# Patient Record
Sex: MALE | Race: WHITE | NOT HISPANIC OR LATINO | Employment: OTHER | URBAN - METROPOLITAN AREA
[De-identification: names, ages, dates, MRNs, and addresses within clinical notes are randomized per-mention and may not be internally consistent; named-entity substitution may affect disease eponyms.]

---

## 2017-03-03 ENCOUNTER — ANESTHESIA EVENT (OUTPATIENT)
Dept: GASTROENTEROLOGY | Facility: CLINIC | Age: 72
End: 2017-03-03
Payer: MEDICARE

## 2017-03-03 ASSESSMENT — LIFESTYLE VARIABLES: TOBACCO_USE: 1

## 2017-03-03 NOTE — ANESTHESIA PREPROCEDURE EVALUATION
Anesthesia Evaluation     . Pt has had prior anesthetic. Type: MAC      ROS/MED HX    ENT/Pulmonary:     (+)tobacco use, Past use , . .    Neurologic: Comment: Raynaud's      Cardiovascular:     (+) hypertension----. : . . . :. .       METS/Exercise Tolerance:  3 - Able to walk 1-2 blocks without stopping   Hematologic:     (+) Other Hematologic Disorder-thrombocytosis      Musculoskeletal:  - neg musculoskeletal ROS       GI/Hepatic:     (+) Other GI/Hepatic history of colon cancer     (-) liver disease   Renal/Genitourinary:  - ROS Renal section negative       Endo:  - neg endo ROS       Psychiatric:  - neg psychiatric ROS       Infectious Disease:  - neg infectious disease ROS       Malignancy:   (+) Malignancy History of GI          Other:    - neg other ROS           Physical Exam  Normal systems: cardiovascular, pulmonary and dental    Airway   Mallampati: II    Dental     Cardiovascular       Pulmonary                     Anesthesia Plan      History & Physical Review  History and physical reviewed and following examination; no interval change.    ASA Status:  2 .    NPO Status:  > 4 hours    Plan for MAC with Intravenous induction. Reason for MAC:  Deep or markedly invasive procedure (G8)         Postoperative Care      Consents  Anesthetic plan, risks, benefits and alternatives discussed with:  Patient..                          .

## 2017-03-06 ENCOUNTER — HOSPITAL ENCOUNTER (OUTPATIENT)
Facility: CLINIC | Age: 72
Discharge: HOME | End: 2017-03-06
Attending: SURGERY | Admitting: SURGERY
Payer: MEDICARE

## 2017-03-06 ENCOUNTER — SURGERY (OUTPATIENT)
Age: 72
End: 2017-03-06

## 2017-03-06 ENCOUNTER — ANESTHESIA (OUTPATIENT)
Dept: GASTROENTEROLOGY | Facility: CLINIC | Age: 72
End: 2017-03-06
Payer: MEDICARE

## 2017-03-06 VITALS
OXYGEN SATURATION: 99 % | HEIGHT: 68 IN | SYSTOLIC BLOOD PRESSURE: 124 MMHG | DIASTOLIC BLOOD PRESSURE: 79 MMHG | TEMPERATURE: 97.5 F | RESPIRATION RATE: 16 BRPM | BODY MASS INDEX: 19.7 KG/M2 | WEIGHT: 130 LBS

## 2017-03-06 LAB — COLONOSCOPY: NORMAL

## 2017-03-06 PROCEDURE — 25000128 H RX IP 250 OP 636: Performed by: NURSE ANESTHETIST, CERTIFIED REGISTERED

## 2017-03-06 PROCEDURE — 25000125 ZZHC RX 250: Performed by: SURGERY

## 2017-03-06 PROCEDURE — 37000008 ZZH ANESTHESIA TECHNICAL FEE, 1ST 30 MIN: Performed by: SURGERY

## 2017-03-06 PROCEDURE — 25800025 ZZH RX 258: Performed by: SURGERY

## 2017-03-06 PROCEDURE — 25000125 ZZHC RX 250: Performed by: NURSE ANESTHETIST, CERTIFIED REGISTERED

## 2017-03-06 PROCEDURE — 45378 DIAGNOSTIC COLONOSCOPY: CPT | Performed by: SURGERY

## 2017-03-06 RX ORDER — LIDOCAINE 40 MG/G
CREAM TOPICAL
Status: DISCONTINUED | OUTPATIENT
Start: 2017-03-06 | End: 2017-03-06 | Stop reason: HOSPADM

## 2017-03-06 RX ORDER — SODIUM CHLORIDE, SODIUM LACTATE, POTASSIUM CHLORIDE, CALCIUM CHLORIDE 600; 310; 30; 20 MG/100ML; MG/100ML; MG/100ML; MG/100ML
INJECTION, SOLUTION INTRAVENOUS CONTINUOUS
Status: DISCONTINUED | OUTPATIENT
Start: 2017-03-06 | End: 2017-03-06 | Stop reason: HOSPADM

## 2017-03-06 RX ORDER — PROPOFOL 10 MG/ML
INJECTION, EMULSION INTRAVENOUS CONTINUOUS PRN
Status: DISCONTINUED | OUTPATIENT
Start: 2017-03-06 | End: 2017-03-06

## 2017-03-06 RX ORDER — PROPOFOL 10 MG/ML
INJECTION, EMULSION INTRAVENOUS PRN
Status: DISCONTINUED | OUTPATIENT
Start: 2017-03-06 | End: 2017-03-06

## 2017-03-06 RX ORDER — ONDANSETRON 2 MG/ML
INJECTION INTRAMUSCULAR; INTRAVENOUS PRN
Status: DISCONTINUED | OUTPATIENT
Start: 2017-03-06 | End: 2017-03-06

## 2017-03-06 RX ORDER — ONDANSETRON 2 MG/ML
4 INJECTION INTRAMUSCULAR; INTRAVENOUS
Status: DISCONTINUED | OUTPATIENT
Start: 2017-03-06 | End: 2017-03-06 | Stop reason: HOSPADM

## 2017-03-06 RX ORDER — GLYCOPYRROLATE 0.2 MG/ML
INJECTION, SOLUTION INTRAMUSCULAR; INTRAVENOUS PRN
Status: DISCONTINUED | OUTPATIENT
Start: 2017-03-06 | End: 2017-03-06

## 2017-03-06 RX ADMIN — PROPOFOL 20 MG: 10 INJECTION, EMULSION INTRAVENOUS at 09:00

## 2017-03-06 RX ADMIN — PROPOFOL 20 MG: 10 INJECTION, EMULSION INTRAVENOUS at 09:02

## 2017-03-06 RX ADMIN — SODIUM CHLORIDE, POTASSIUM CHLORIDE, SODIUM LACTATE AND CALCIUM CHLORIDE: 600; 310; 30; 20 INJECTION, SOLUTION INTRAVENOUS at 08:28

## 2017-03-06 RX ADMIN — GLYCOPYRROLATE 0.2 MG: 0.2 INJECTION, SOLUTION INTRAMUSCULAR; INTRAVENOUS at 08:59

## 2017-03-06 RX ADMIN — PROPOFOL 20 MG: 10 INJECTION, EMULSION INTRAVENOUS at 09:03

## 2017-03-06 RX ADMIN — PROPOFOL 200 MCG/KG/MIN: 10 INJECTION, EMULSION INTRAVENOUS at 09:02

## 2017-03-06 RX ADMIN — PROPOFOL 20 MG: 10 INJECTION, EMULSION INTRAVENOUS at 09:04

## 2017-03-06 RX ADMIN — LIDOCAINE HYDROCHLORIDE 1 ML: 10 INJECTION, SOLUTION INFILTRATION; PERINEURAL at 08:29

## 2017-03-06 RX ADMIN — ONDANSETRON 4 MG: 2 INJECTION INTRAMUSCULAR; INTRAVENOUS at 08:59

## 2017-03-06 RX ADMIN — PROPOFOL 20 MG: 10 INJECTION, EMULSION INTRAVENOUS at 09:01

## 2017-03-06 NOTE — ANESTHESIA POSTPROCEDURE EVALUATION
Patient: Claus Chambers    Procedure(s):  Colonoscopy   - Wound Class: II-Clean Contaminated    Diagnosis:screening  Diagnosis Additional Information: No value filed.    Anesthesia Type:  MAC    Note:  Anesthesia Post Evaluation    Patient location during evaluation: Phase 2  Patient participation: Able to fully participate in evaluation  Level of consciousness: awake  Pain management: adequate  Airway patency: patent  Cardiovascular status: acceptable  Respiratory status: acceptable  Hydration status: acceptable  PONV: none     Anesthetic complications: None          Last vitals:  Vitals:    03/06/17 0740   BP: 132/69   Resp: 18   Temp: 36.4  C (97.5  F)   SpO2: 100%         Electronically Signed By: Rey Hansen CRNA, APRN CRNA  March 6, 2017  9:26 AM

## 2017-03-06 NOTE — H&P
"[facility]  GI Pre-Procedure History & Physical      Name: Claus Chambers MRN#: [id number]   Age: 71 year old YOB: 1945     Date of Procedure: 3/6/2017    Primary care provider: Vladimir Quiñones      Reason for Procedure:   Screening (V76.51), Personal History of Polyps (V12.72), history of colon cancer    Problem List:   See a copy of the patient s current problem list from [facility].  I have reviewed this document and have no additions or corrections.    Current Outpatient Medications:      No current outpatient prescriptions on file.        Allergies:      Allergies   Allergen Reactions     Nka [No Known Allergies]         History:   See a copy of the patient s current past history from [facility].  I have reviewed this document and have no additions or corrections.    Physical Exam:   Vital Signs:  /69  Temp 97.5  F (36.4  C) (Oral)  Resp 18  Ht 1.727 m (5' 8\")  Wt 59 kg (130 lb)  SpO2 100%  BMI 19.77 kg/m2  Airway assessment:  Patient is able to open mouth wide  Patient is able to stick out tongue    ASA:  2  Mallampati Score: 2     Lungs:  No increased work of breathing, good air exchange, clear to auscultation bilaterally, no crackles or wheezing.   Cardiovascular:  Normal- regular rate and rhythm, normal S1 - S2, no S3 or S4, and no murmur noted.  Gastrointestinal:  Abdomen soft, non-tender.  BS normal. No masses, organomegaly.        Assessment:   Appropriately NPO.  No significant changes since H&P.    Plan:   Moderate (conscious) sedation     General and specific risks of the procedure of the procedure including pain, bleeding, and perforation were discussed. Possibility of missing lesions discussed. Prep and recovery were discussed. He asked appropriate questions and provided consent.      Patient's active problems diagnostically and therapeutically optimized for the planned procedure. Risks, benefits, alternatives to sedation and blood explained and consent obtained.  Risks, " benefits, alternatives to procedure explained and consent obtained.    I have reviewed the history and physical, lab finding(s), diagnostic data, medications, and the plan for sedation.  I have determined this patient to be an appropriate candidate for the planned sedation/procedure and have reassessed the patient immediately prior to sedation/procedure.    Elliot Edmonds MD

## 2017-03-06 NOTE — ANESTHESIA CARE TRANSFER NOTE
Patient: Claus Chambers    Procedure(s):  Colonoscopy   - Wound Class: II-Clean Contaminated    Diagnosis: screening  Diagnosis Additional Information: No value filed.    Anesthesia Type:   MAC     Note:    Patient transferred to:Phase II        Vitals: (Last set prior to Anesthesia Care Transfer)    CRNA VITALS  3/6/2017 0853 - 3/6/2017 0925      3/6/2017             Pulse: 104    Ht Rate: 104    SpO2: 100 %                Electronically Signed By: Rey Hansen CRNA, APRN CRNA  March 6, 2017  9:25 AM

## 2017-03-06 NOTE — ANESTHESIA CARE TRANSFER NOTE
Patient: Claus Chambers    Procedure(s):  Colonoscopy   - Wound Class: II-Clean Contaminated    Diagnosis: screening  Diagnosis Additional Information: No value filed.    Anesthesia Type:   MAC     Note:    Patient transferred to:Phase II        Vitals: (Last set prior to Anesthesia Care Transfer)    CRNA VITALS  3/6/2017 0853 - 3/6/2017 0926      3/6/2017             Pulse: 104    Ht Rate: 104    SpO2: 100 %                Electronically Signed By: Rey Hansen CRNA, APRN CRNA  March 6, 2017  9:26 AM

## 2021-06-01 ENCOUNTER — RECORDS - HEALTHEAST (OUTPATIENT)
Dept: ADMINISTRATIVE | Facility: CLINIC | Age: 76
End: 2021-06-01

## 2022-07-02 ENCOUNTER — HOSPITAL ENCOUNTER (EMERGENCY)
Facility: HOSPITAL | Age: 77
Discharge: HOME | End: 2022-07-02
Attending: EMERGENCY MEDICINE | Admitting: EMERGENCY MEDICINE
Payer: COMMERCIAL

## 2022-07-02 VITALS
HEART RATE: 101 BPM | TEMPERATURE: 98 F | WEIGHT: 138 LBS | SYSTOLIC BLOOD PRESSURE: 134 MMHG | OXYGEN SATURATION: 98 % | BODY MASS INDEX: 20.98 KG/M2 | DIASTOLIC BLOOD PRESSURE: 64 MMHG | RESPIRATION RATE: 16 BRPM

## 2022-07-02 DIAGNOSIS — R33.9 URINARY RETENTION: ICD-10-CM

## 2022-07-02 DIAGNOSIS — N30.01 ACUTE CYSTITIS WITH HEMATURIA: ICD-10-CM

## 2022-07-02 LAB
ALBUMIN UR-MCNC: 30 MG/DL
APPEARANCE UR: ABNORMAL
BACTERIA #/AREA URNS HPF: ABNORMAL /HPF
BILIRUB UR QL STRIP: NEGATIVE
COLOR UR AUTO: YELLOW
GLUCOSE UR STRIP-MCNC: NEGATIVE MG/DL
HGB UR QL STRIP: ABNORMAL
KETONES UR STRIP-MCNC: ABNORMAL MG/DL
LEUKOCYTE ESTERASE UR QL STRIP: ABNORMAL
NITRATE UR QL: NEGATIVE
PH UR STRIP: 6 [PH] (ref 5–7)
RBC URINE: 4 /HPF
SP GR UR STRIP: 1.01 (ref 1–1.03)
UROBILINOGEN UR STRIP-MCNC: <2 MG/DL
WBC CLUMPS #/AREA URNS HPF: PRESENT /HPF
WBC URINE: >182 /HPF

## 2022-07-02 PROCEDURE — 250N000013 HC RX MED GY IP 250 OP 250 PS 637: Performed by: EMERGENCY MEDICINE

## 2022-07-02 PROCEDURE — 87186 SC STD MICRODIL/AGAR DIL: CPT | Performed by: EMERGENCY MEDICINE

## 2022-07-02 PROCEDURE — 99284 EMERGENCY DEPT VISIT MOD MDM: CPT | Mod: 25

## 2022-07-02 PROCEDURE — 51702 INSERT TEMP BLADDER CATH: CPT

## 2022-07-02 PROCEDURE — 51798 US URINE CAPACITY MEASURE: CPT

## 2022-07-02 PROCEDURE — 81001 URINALYSIS AUTO W/SCOPE: CPT | Performed by: EMERGENCY MEDICINE

## 2022-07-02 RX ORDER — CEPHALEXIN 500 MG/1
500 CAPSULE ORAL 4 TIMES DAILY
Qty: 40 CAPSULE | Refills: 0 | Status: SHIPPED | OUTPATIENT
Start: 2022-07-02 | End: 2022-07-12

## 2022-07-02 RX ORDER — CEPHALEXIN 500 MG/1
500 CAPSULE ORAL ONCE
Status: COMPLETED | OUTPATIENT
Start: 2022-07-02 | End: 2022-07-02

## 2022-07-02 RX ADMIN — CEPHALEXIN 500 MG: 500 CAPSULE ORAL at 22:21

## 2022-07-03 ENCOUNTER — HOSPITAL ENCOUNTER (EMERGENCY)
Facility: HOSPITAL | Age: 77
Discharge: HOME | End: 2022-07-03
Attending: EMERGENCY MEDICINE | Admitting: EMERGENCY MEDICINE
Payer: COMMERCIAL

## 2022-07-03 VITALS
TEMPERATURE: 98.2 F | HEART RATE: 83 BPM | BODY MASS INDEX: 20.98 KG/M2 | DIASTOLIC BLOOD PRESSURE: 81 MMHG | WEIGHT: 138 LBS | SYSTOLIC BLOOD PRESSURE: 180 MMHG | OXYGEN SATURATION: 96 % | RESPIRATION RATE: 20 BRPM

## 2022-07-03 DIAGNOSIS — R19.7 DIARRHEA, UNSPECIFIED TYPE: ICD-10-CM

## 2022-07-03 DIAGNOSIS — R10.9 ABDOMINAL CRAMPING: ICD-10-CM

## 2022-07-03 LAB
ALBUMIN SERPL-MCNC: 3.6 G/DL (ref 3.5–5)
ALP SERPL-CCNC: 75 U/L (ref 45–120)
ALT SERPL W P-5'-P-CCNC: <9 U/L (ref 0–45)
ANION GAP SERPL CALCULATED.3IONS-SCNC: 14 MMOL/L (ref 5–18)
AST SERPL W P-5'-P-CCNC: 12 U/L (ref 0–40)
BASOPHILS # BLD AUTO: 0 10E3/UL (ref 0–0.2)
BASOPHILS NFR BLD AUTO: 0 %
BILIRUB DIRECT SERPL-MCNC: 0.4 MG/DL
BILIRUB SERPL-MCNC: 1 MG/DL (ref 0–1)
BUN SERPL-MCNC: 14 MG/DL (ref 8–28)
CALCIUM SERPL-MCNC: 9.2 MG/DL (ref 8.5–10.5)
CHLORIDE BLD-SCNC: 106 MMOL/L (ref 98–107)
CO2 SERPL-SCNC: 16 MMOL/L (ref 22–31)
CREAT SERPL-MCNC: 1.22 MG/DL (ref 0.7–1.3)
EOSINOPHIL # BLD AUTO: 0 10E3/UL (ref 0–0.7)
EOSINOPHIL NFR BLD AUTO: 0 %
ERYTHROCYTE [DISTWIDTH] IN BLOOD BY AUTOMATED COUNT: 16.3 % (ref 10–15)
GFR SERPL CREATININE-BSD FRML MDRD: 60 ML/MIN/1.73M2
GLUCOSE BLD-MCNC: 96 MG/DL (ref 70–125)
HCT VFR BLD AUTO: 37.5 % (ref 40–53)
HGB BLD-MCNC: 13.1 G/DL (ref 13.3–17.7)
HOLD SPECIMEN: NORMAL
IMM GRANULOCYTES # BLD: 0.1 10E3/UL
IMM GRANULOCYTES NFR BLD: 1 %
LACTATE SERPL-SCNC: 1.4 MMOL/L (ref 0.7–2)
LIPASE SERPL-CCNC: 9 U/L (ref 0–52)
LYMPHOCYTES # BLD AUTO: 0.6 10E3/UL (ref 0.8–5.3)
LYMPHOCYTES NFR BLD AUTO: 6 %
MAGNESIUM SERPL-MCNC: 1.8 MG/DL (ref 1.8–2.6)
MCH RBC QN AUTO: 39.1 PG (ref 26.5–33)
MCHC RBC AUTO-ENTMCNC: 34.9 G/DL (ref 31.5–36.5)
MCV RBC AUTO: 112 FL (ref 78–100)
MONOCYTES # BLD AUTO: 1 10E3/UL (ref 0–1.3)
MONOCYTES NFR BLD AUTO: 9 %
NEUTROPHILS # BLD AUTO: 8.6 10E3/UL (ref 1.6–8.3)
NEUTROPHILS NFR BLD AUTO: 84 %
NRBC # BLD AUTO: 0 10E3/UL
NRBC BLD AUTO-RTO: 0 /100
PLATELET # BLD AUTO: 234 10E3/UL (ref 150–450)
POTASSIUM BLD-SCNC: 4 MMOL/L (ref 3.5–5)
PROT SERPL-MCNC: 6.4 G/DL (ref 6–8)
RBC # BLD AUTO: 3.35 10E6/UL (ref 4.4–5.9)
SODIUM SERPL-SCNC: 136 MMOL/L (ref 136–145)
WBC # BLD AUTO: 10.2 10E3/UL (ref 4–11)

## 2022-07-03 PROCEDURE — 96360 HYDRATION IV INFUSION INIT: CPT

## 2022-07-03 PROCEDURE — 36415 COLL VENOUS BLD VENIPUNCTURE: CPT | Performed by: EMERGENCY MEDICINE

## 2022-07-03 PROCEDURE — 83735 ASSAY OF MAGNESIUM: CPT | Performed by: EMERGENCY MEDICINE

## 2022-07-03 PROCEDURE — 83605 ASSAY OF LACTIC ACID: CPT | Performed by: EMERGENCY MEDICINE

## 2022-07-03 PROCEDURE — 85025 COMPLETE CBC W/AUTO DIFF WBC: CPT | Performed by: EMERGENCY MEDICINE

## 2022-07-03 PROCEDURE — 258N000003 HC RX IP 258 OP 636: Performed by: EMERGENCY MEDICINE

## 2022-07-03 PROCEDURE — 250N000013 HC RX MED GY IP 250 OP 250 PS 637: Performed by: EMERGENCY MEDICINE

## 2022-07-03 PROCEDURE — 82248 BILIRUBIN DIRECT: CPT | Performed by: EMERGENCY MEDICINE

## 2022-07-03 PROCEDURE — 83690 ASSAY OF LIPASE: CPT | Performed by: EMERGENCY MEDICINE

## 2022-07-03 PROCEDURE — 99284 EMERGENCY DEPT VISIT MOD MDM: CPT | Mod: 25

## 2022-07-03 RX ORDER — DICYCLOMINE HCL 20 MG
20 TABLET ORAL 4 TIMES DAILY PRN
Qty: 21 TABLET | Refills: 0 | Status: SHIPPED | OUTPATIENT
Start: 2022-07-03 | End: 2022-07-13

## 2022-07-03 RX ORDER — LOPERAMIDE HYDROCHLORIDE 2 MG/1
2 TABLET ORAL 4 TIMES DAILY PRN
Qty: 21 TABLET | Refills: 0 | Status: SHIPPED | OUTPATIENT
Start: 2022-07-03

## 2022-07-03 RX ORDER — DICYCLOMINE HCL 20 MG
20 TABLET ORAL ONCE
Status: COMPLETED | OUTPATIENT
Start: 2022-07-03 | End: 2022-07-03

## 2022-07-03 RX ADMIN — DICYCLOMINE HYDROCHLORIDE 20 MG: 20 TABLET ORAL at 11:26

## 2022-07-03 RX ADMIN — SODIUM CHLORIDE, POTASSIUM CHLORIDE, SODIUM LACTATE AND CALCIUM CHLORIDE 1000 ML: 600; 310; 30; 20 INJECTION, SOLUTION INTRAVENOUS at 11:24

## 2022-07-03 ASSESSMENT — ENCOUNTER SYMPTOMS
DYSURIA: 0
ABDOMINAL PAIN: 0
SHORTNESS OF BREATH: 0
FEVER: 0
DIARRHEA: 1
HEMATURIA: 0
VOMITING: 0
SORE THROAT: 0
CONFUSION: 0
DIZZINESS: 0
JOINT SWELLING: 0
CHILLS: 0
NAUSEA: 0

## 2022-07-03 NOTE — ED NOTES
Bed: JNEDP-03  Expected date: 7/3/22  Expected time:   Means of arrival: Ambulance  Comments:  [facility]  Abd pain  77 M

## 2022-07-03 NOTE — ED NOTES
Wife at bedside. Denies blood in stool. Pt having pain only when stooling. Dr. Arreguin in room with pt. Pt did not take morning meds

## 2022-07-03 NOTE — ED TRIAGE NOTES
Pt brought in by [facility].  Pt states he has had difficulty urinating for the past week.  He had a Lombardi catheter removed last Friday after having urethral surgery.  He last urinated this afternoon, but unable to fully empty bladder.  Pt c/o bladder pain.  Pt denies fever.     Triage Assessment     Row Name 07/02/22 2016       Triage Assessment (Adult)    Airway WDL WDL       Respiratory WDL    Respiratory WDL WDL       Skin Circulation/Temperature WDL    Skin Circulation/Temperature WDL WDL       Cardiac WDL    Cardiac WDL WDL       Peripheral/Neurovascular WDL    Peripheral Neurovascular WDL WDL       Cognitive/Neuro/Behavioral WDL    Cognitive/Neuro/Behavioral WDL WDL

## 2022-07-03 NOTE — DISCHARGE INSTRUCTIONS
You were seen in the Emergency Department today for evaluation of urinary retention.  Your lab work showed a urinary tract infection. You were prescribed Keflex to take 4 times a day for 10 days. You should take all medications as prescribed.  Follow up with your primary care physician to ensure resolution of symptoms. Return if you have new or worsening symptoms.

## 2022-07-03 NOTE — ED PROVIDER NOTES
Emergency Department Encounter     Evaluation Date & Time:   7/3/2022 10:50 AM    CHIEF COMPLAINT:  Diarrhea      Triage Note:  Pt here yesterday and diagnosed with UTI. Peterson placed and started on keflex and sent home pt having episodes of abd cramping and diarrhea. Lives in own home with wife. [facility] medics brought pt in. Hx colon cancer with surgery 8 years ago               ED COURSE & MEDICAL DECISION MAKING:     ED Course as of 07/03/22 1345   Sun Jul 03, 2022   1219 WBC wnl, lactate unremarkable.   1235 Labs all overall reassuring, no KEATON or severe electrolyte derangement.   1249 Pt re-evaluated and updated. Abdomen benign, feels improved. Rx for imodium and bentyl prn. Encouraged good fluid intake, outpatient follow up.     Pt presenting with new onset of diarrhea with intermittent abdominal cramping starting late last night.  Pt seen here yesterday for urinary retention and UTI, had peterson placed, given first dose of PO keflex, has not started rest at home yet.  Pt with entirely benign abdomen here now.  He seems to have some chronic memory deficits, so wife helps with details of events.  Will get labs, hydrate and reassess.  Unlikely C-diff given no prior history and only receiving 1 dose of antibx.  Pt otherwise well appearing, reassuring vitals, afebrile, no current abd pain.    11:05 AM I met with the patient, obtained history, performed an initial exam, and discussed options and plan for diagnostics and treatment here in the ED.  12:36 PM I rechecked and updated the patient with results.  1:01 PM Patient ready for discharge.   At the conclusion of the encounter I discussed the results of all the tests and the disposition. The questions were answered. The patient or family acknowledged understanding and was agreeable with the care plan.      MEDICATIONS GIVEN IN THE EMERGENCY DEPARTMENT:  Medications   lactated ringers BOLUS 1,000 mL (0 mLs Intravenous Stopped 7/3/22 1253)   dicyclomine (BENTYL)  tablet 20 mg (20 mg Oral Given 7/3/22 1126)       NEW PRESCRIPTIONS STARTED AT TODAY'S ED VISIT:  Discharge Medication List as of 7/3/2022 12:53 PM      START taking these medications    Details   dicyclomine (BENTYL) 20 MG tablet Take 1 tablet (20 mg) by mouth 4 times daily as needed (abdominal pain/cramping), Disp-21 tablet, R-0, E-Prescribe      loperamide (IMODIUM A-D) 2 MG tablet Take 1 tablet (2 mg) by mouth 4 times daily as needed for diarrhea, Disp-21 tablet, R-0, E-Prescribe             HPI   HPI     Claus Chambers is a 77 year old male with a pertinent history of HTN, thrombocytosis, colon cancer who presents to this ED via EMS for evaluation of diarrhea.    Per patient's wife,  Patient was in the ER yesterday due to urinary retention. Patient had 1000 cc of urine drained. Patient had a bladder infection and was started on antibiotics. Patient had 2 episodes of diarrhea last night and incontinence this morning.     Patient denies any recent injuries or falls. States he had abdominal pain, which resolved after he had a bowel movement.    REVIEW OF SYSTEMS:  Review of Systems   Constitutional: Negative for chills and fever.   HENT: Negative for sore throat.    Eyes: Negative for visual disturbance.   Respiratory: Negative for shortness of breath.    Cardiovascular: Negative for chest pain.   Gastrointestinal: Positive for diarrhea. Negative for abdominal pain, nausea and vomiting.   Endocrine: Negative for polyuria.   Genitourinary: Negative for dysuria and hematuria.        - urinary changes     Musculoskeletal: Negative for joint swelling.   Skin: Negative for rash.   Neurological: Negative for dizziness.   Psychiatric/Behavioral: Negative for confusion.   All other systems reviewed and are negative.          Medical History   No past medical history on file.    Past Surgical History:   Procedure Laterality Date     COLON SURGERY Right 4/6/2015     COLONOSCOPY N/A 3/5/2015    Procedure: COMBINED COLONOSCOPY,  SINGLE OR MULTIPLE BIOPSY/POLYPECTOMY BY BIOPSY;  Surgeon: Elliot Edmonds MD;  Location: [facility]     COLONOSCOPY N/A 2016    Procedure: COLONOSCOPY;  Surgeon: Elliot Edmonds MD;  Location: [facility]     COLONOSCOPY N/A 3/6/2017    Procedure: COLONOSCOPY;  Surgeon: Elliot Edmonds MD;  Location: [facility]     HEMORRHOIDECTOMY EXTERNAL       JOINT REPLACEMENT Right     knee       Family History   Problem Relation Age of Onset     Dementia Mother      Coronary Artery Disease Father      Cerebrovascular Disease Father      Pneumonia Father        Social History     Tobacco Use     Smoking status: Former Smoker     Quit date: 2013     Years since quittin.6       dicyclomine (BENTYL) 20 MG tablet  loperamide (IMODIUM A-D) 2 MG tablet  AMLODIPINE BESYLATE 10 MG OR TABS  ASPIRIN 325 MG OR TABS  cephALEXin (KEFLEX) 500 MG capsule  hydroxyurea (HYDREA) 500 MG capsule  LISINOPRIL PO  psyllium 0.52 G capsule  senna-docusate (SENOKOT-S;PERICOLACE) 8.6-50 MG per tablet  TYLENOL COLD MULTI-SYMPTOM 5--325 MG OR TABS        Physical Exam     Vitals:  BP (!) 180/81   Pulse 83   Temp 98.2  F (36.8  C) (Oral)   Resp 20   Wt 62.6 kg (138 lb)   SpO2 96%   BMI 20.98 kg/m      PHYSICAL EXAM:   Physical Exam  Vitals and nursing note reviewed.   Constitutional:       General: He is not in acute distress.     Appearance: Normal appearance.   HENT:      Head: Normocephalic and atraumatic.      Nose: Nose normal.      Mouth/Throat:      Mouth: Mucous membranes are moist.   Eyes:      Pupils: Pupils are equal, round, and reactive to light.   Cardiovascular:      Rate and Rhythm: Normal rate and regular rhythm.      Pulses: Normal pulses.           Radial pulses are 2+ on the right side and 2+ on the left side.        Dorsalis pedis pulses are 2+ on the right side and 2+ on the left side.   Pulmonary:      Effort: Pulmonary effort is normal. No respiratory distress.      Breath sounds: Normal breath sounds.    Abdominal:      Palpations: Abdomen is soft.      Tenderness: There is no abdominal tenderness.   Musculoskeletal:      Cervical back: Full passive range of motion without pain and neck supple.      Comments: No calf tenderness or swelling b/l   Skin:     General: Skin is warm.      Findings: No rash.   Neurological:      General: No focal deficit present.      Mental Status: He is alert. Mental status is at baseline.      Comments: Fluent speech, no acute lateralizing deficits   Psychiatric:         Mood and Affect: Mood normal.         Behavior: Behavior normal.         Results     LAB:  All pertinent labs reviewed and interpreted  Labs Ordered and Resulted from Time of ED Arrival to Time of ED Departure   BASIC METABOLIC PANEL - Abnormal       Result Value    Sodium 136      Potassium 4.0      Chloride 106      Carbon Dioxide (CO2) 16 (*)     Anion Gap 14      Urea Nitrogen 14      Creatinine 1.22      Calcium 9.2      Glucose 96      GFR Estimate 60 (*)    CBC WITH PLATELETS AND DIFFERENTIAL - Abnormal    WBC Count 10.2      RBC Count 3.35 (*)     Hemoglobin 13.1 (*)     Hematocrit 37.5 (*)      (*)     MCH 39.1 (*)     MCHC 34.9      RDW 16.3 (*)     Platelet Count 234      % Neutrophils 84      % Lymphocytes 6      % Monocytes 9      % Eosinophils 0      % Basophils 0      % Immature Granulocytes 1      NRBCs per 100 WBC 0      Absolute Neutrophils 8.6 (*)     Absolute Lymphocytes 0.6 (*)     Absolute Monocytes 1.0      Absolute Eosinophils 0.0      Absolute Basophils 0.0      Absolute Immature Granulocytes 0.1      Absolute NRBCs 0.0     LACTIC ACID WHOLE BLOOD - Normal    Lactic Acid 1.4     LIPASE - Normal    Lipase 9     HEPATIC FUNCTION PANEL - Normal    Bilirubin Total 1.0      Bilirubin Direct 0.4      Protein Total 6.4      Albumin 3.6      Alkaline Phosphatase 75      AST 12      ALT <9     MAGNESIUM - Normal    Magnesium 1.8         RADIOLOGY:  No orders to display                 ECG:  none    PROCEDURES:  Procedures:  none      FINAL IMPRESSION:    ICD-10-CM    1. Diarrhea, unspecified type  R19.7    2. Abdominal cramping  R10.9        0 minutes of critical care time      I, Jaodn Irizarry, am serving as a scribe to document services personally performed by Dr. Ole Arreguin, based on my observations and the provider's statements to me. I, Ole Arreguin, DO attest that Jadon Irizarry is acting in a scribe capacity, has observed my performance of the services and has documented them in accordance with my direction.      Ole Arreguin DO  Emergency Medicine  [facility] EMERGENCY DEPARTMENT  7/3/2022  11:05 AM          Ole Arreguin MD  07/03/22 1316

## 2022-07-03 NOTE — ED TRIAGE NOTES
Pt here yesterday and diagnosed with UTI. Lombardi placed and started on keflex and sent home pt having episodes of abd cramping and diarrhea. Lives in own home with wife. [facility] medics brought pt in. Hx colon cancer with surgery 8 years ago     Triage Assessment     Row Name 07/03/22 1056       Triage Assessment (Adult)    Airway WDL WDL

## 2022-07-03 NOTE — ED PROVIDER NOTES
EMERGENCY DEPARTMENT ENCOUNTER      NAME: Claus Chambers  AGE: 77 year old male  YOB: 1945  MRN: [id number]  EVALUATION DATE & TIME: 7/2/2022  8:13 PM    PCP: Vladimir Quiñones    ED PROVIDER: Koki Garcia M.D.      Chief Complaint   Patient presents with     Difficulty urinating     FINAL IMPRESSION:  1. Acute cystitis with hematuria    2. Urinary retention      ED COURSE & MEDICAL DECISION MAKING:    Pertinent Labs & Imaging studies reviewed. (See chart for details)  ED Course as of 07/02/22 2254   Sat Jul 02, 2022 2045 I ordered a bladder scan on the patient came in given his complaint.  It looks like he has 1000 mL in the bladder so I have also ordered a Lombardi catheter and a urinalysis.   2109 Patient is a 77-year-old male comes in today for evaluation of urinary retention.  He had a procedure back in May with Dr. Lewis with urology where they dilated a urethral stricture.  He had a Lombardi catheter after that and was removed and he was doing well.  He reports his symptoms did not start up until today.  He came in tachycardic and hypertensive and when I saw him he was very uncomfortable.  He had greater than a liter on the bladder scan and I had ordered a Lombardi catheter.  They ended up putting it in when I was in there.  He started to get immediate relief.  We will send his urine for evaluation to see if there is any signs of infection.  Either way he is can leave the catheter in and he will need to follow-up with Dr. Lewis as an outpatient.  I discussed this with him and his wife and they are in agreement with the plan.  He is looking much better now that the catheter has been placed.   2159 Urine does appear infected so I will put the patient on some antibiotics and he can follow-up with Dr. Lewis as an outpatient.   2210 I updated the patient and family on the plan.  They are in agreement.  We will get him discharged.     8:57 PM I met with the patient for the initial interview and physical  examination. Discussed plan for treatment and workup in the ED.      At the conclusion of the encounter I discussed  the results of all of the tests and the disposition with patient.   All questions were answered.  The patient acknowledged understanding and was involved in the decision making regarding the overall care plan.      I discussed with patient the utility, limitations and findings of the exam/interventions/studies done during this visit as well as the list of differential diagnosis and symptoms to monitor/return to ER for.  Additional verbal discharge instructions were provided.     MEDICATIONS GIVEN IN THE EMERGENCY:  Medications   cephALEXin (KEFLEX) capsule 500 mg (500 mg Oral Given 7/2/22 2221)       NEW PRESCRIPTIONS STARTED AT TODAY'S ER VISIT  Discharge Medication List as of 7/2/2022 10:29 PM      START taking these medications    Details   cephALEXin (KEFLEX) 500 MG capsule Take 1 capsule (500 mg) by mouth 4 times daily for 10 days, Disp-40 capsule, R-0, E-Prescribe            ================================================================    HPI    Triage Note:   Pt brought in by [employer].  Pt states he has had difficulty urinating for the past week.  He had a Lombardi catheter removed last Friday after having urethral surgery.  He last urinated this afternoon, but unable to fully empty bladder.  Pt c/o bladder pain.  Pt denies fever.     Triage Assessment     Row Name 07/02/22 2016       Triage Assessment (Adult)    Airway WDL WDL       Respiratory WDL    Respiratory WDL WDL       Skin Circulation/Temperature WDL    Skin Circulation/Temperature WDL WDL       Cardiac WDL    Cardiac WDL WDL       Peripheral/Neurovascular WDL    Peripheral Neurovascular WDL WDL       Cognitive/Neuro/Behavioral WDL    Cognitive/Neuro/Behavioral WDL WDL              Patient information was obtained from: Patient     Use of : N/A       Claus Chambers is a 77 year old male with a pertinent medical  history of malignant neoplasm of colon who presents to this ED by ambulance for evaluation of difficulty with urinating.     Patient reports that he has had difficulty urinating suddenly starting today with associated bladder pain. Patient states that he also has associated nausea, vomiting, and diarrhea. He notes that he had a Lombardi catheter removed after having urethral surgery in May. Patient last urinated this afternoon, but was unable to completely empty bladder. He adds that he had previously had urinary retention like this ~13 years ago. Patient denies fever, dysuria, hematuria, or additional symptoms or complaints at this time.     REVIEW OF SYSTEMS   Except as stated in the HPI all other systems reviewed and are negative.    PAST MEDICAL HISTORY:  History reviewed. No pertinent past medical history.    PAST SURGICAL HISTORY:  Past Surgical History:   Procedure Laterality Date     COLON SURGERY Right 4/6/2015     COLONOSCOPY N/A 3/5/2015    Procedure: COMBINED COLONOSCOPY, SINGLE OR MULTIPLE BIOPSY/POLYPECTOMY BY BIOPSY;  Surgeon: Elliot Edmonds MD;  Location: [facility]     COLONOSCOPY N/A 4/11/2016    Procedure: COLONOSCOPY;  Surgeon: Elliot Edmonds MD;  Location: [facility]     COLONOSCOPY N/A 3/6/2017    Procedure: COLONOSCOPY;  Surgeon: Elliot Edmonds MD;  Location: [facility]     HEMORRHOIDECTOMY EXTERNAL       JOINT REPLACEMENT Right     knee       CURRENT MEDICATIONS:    No current facility-administered medications for this encounter.    Current Outpatient Medications:      cephALEXin (KEFLEX) 500 MG capsule, Take 1 capsule (500 mg) by mouth 4 times daily for 10 days, Disp: 40 capsule, Rfl: 0     AMLODIPINE BESYLATE 10 MG OR TABS, 1 TABLET BY MOUTH DAILY, Disp: , Rfl:      ASPIRIN 325 MG OR TABS, 1 TABLET BY MOUTH DAILY, Disp: , Rfl:      hydroxyurea (HYDREA) 500 MG capsule, Take by mouth daily, Disp: , Rfl:      LISINOPRIL PO, Take 20 mg by mouth, Disp: , Rfl:      psyllium 0.52 G capsule, Take 1  capsule by mouth daily, Disp: , Rfl:      senna-docusate (SENOKOT-S;PERICOLACE) 8.6-50 MG per tablet, Take 1 tablet by mouth daily, Disp: , Rfl:      TYLENOL COLD MULTI-SYMPTOM 5--325 MG OR TABS, AS NEEDED, Disp: , Rfl:     ALLERGIES:  Allergies   Allergen Reactions     Nka [No Known Allergies]        FAMILY HISTORY:  Family History   Problem Relation Age of Onset     Dementia Mother      Coronary Artery Disease Father      Cerebrovascular Disease Father      Pneumonia Father        SOCIAL HISTORY:   Social History     Socioeconomic History     Marital status:    Tobacco Use     Smoking status: Former Smoker     Quit date: 2013     Years since quittin.6       PHYSICAL EXAM    VITAL SIGNS: /64   Pulse 101   Temp 98  F (36.7  C) (Oral)   Resp 16   Wt 62.6 kg (138 lb)   SpO2 98%   BMI 20.98 kg/m     GENERAL: Awake, Alert, answering questions, Diaphoretic, uncomfortable-appearing  HEENT: Normal cephalic, Atraumatic, bilateral external ears normal, No scleral icterus, mask in place  NECK: No obvious swelling or abnormality, No stridor  PULMONARY:Normal and symmetric breath sounds, No respiratory distress, Lungs clear to auscultation bilaterally. No wheezing  CARDIOVASCULAR: Regular rhythm, Distal pulses present and normal. Tachycardic  ABDOMINAL:  Full palpable bladder with tenderness over the bladder  BACK: No tenderness.  EXTREMITIES: Moves all extremities spontaneously, warm, no edema, No major deformities  NEURO: No facial droop, normal motor function, Normal speech   PSYCH: Normal mood and affect  SKIN: No rashes on visualized skin, diaphoretic, warm     LAB:  All pertinent labs reviewed and interpreted.  Results for orders placed or performed during the hospital encounter of 22   UA with Microscopic reflex to Culture    Specimen: Urine, Lombardi Catheter   Result Value Ref Range    Color Urine Yellow Colorless, Straw, Light Yellow, Yellow    Appearance Urine Turbid (A) Clear     Glucose Urine Negative Negative mg/dL    Bilirubin Urine Negative Negative    Ketones Urine Trace (A) Negative mg/dL    Specific Gravity Urine 1.013 1.001 - 1.030    Blood Urine 0.03 mg/dL (A) Negative    pH Urine 6.0 5.0 - 7.0    Protein Albumin Urine 30  (A) Negative mg/dL    Urobilinogen Urine <2.0 <2.0 mg/dL    Nitrite Urine Negative Negative    Leukocyte Esterase Urine 500 Day/uL (A) Negative    Bacteria Urine Few (A) None Seen /HPF    WBC Clumps Urine Present (A) None Seen /HPF    RBC Urine 4 (H) <=2 /HPF    WBC Urine >182 (H) <=5 /HPF     I, Joseph Kristi, am serving as a scribe to document services personally performed by Dr. Garcia based on my observation and the provider's statements to me. I, Koki Garcia MD attest that Joseph Berry is acting in a scribe capacity, has observed my performance of the services and has documented them in accordance with my direction.    Koki Garcia M.D.  Emergency Medicine  [facility] EMERGENCY DEPARTMENT  [address] [place] [id number] [phone]  Dept: [phone]     Koki Garcia MD  07/02/22 7358

## 2022-07-03 NOTE — ED NOTES
Bed: JNED-19  Expected date: 7/2/22  Expected time: 7:52 PM  Means of arrival: Ambulance  Comments:  77 M [facility], unable to urinate

## 2022-07-03 NOTE — DISCHARGE INSTRUCTIONS
Take bentyl for abdominal pain/cramping.  Take imodium for diarrhea.  Stay hydrated with water/electrolyte fluids.  Follow up with primary clinic.

## 2022-07-05 LAB — BACTERIA UR CULT: ABNORMAL

## 2022-07-22 ENCOUNTER — HOSPITAL ENCOUNTER (EMERGENCY)
Facility: HOSPITAL | Age: 77
Discharge: HOME | End: 2022-07-22
Attending: EMERGENCY MEDICINE
Payer: MEDICARE

## 2022-07-22 VITALS
OXYGEN SATURATION: 93 % | DIASTOLIC BLOOD PRESSURE: 83 MMHG | SYSTOLIC BLOOD PRESSURE: 144 MMHG | BODY MASS INDEX: 19.65 KG/M2 | TEMPERATURE: 98.6 F | HEART RATE: 111 BPM | RESPIRATION RATE: 19 BRPM | WEIGHT: 129.63 LBS | HEIGHT: 68 IN

## 2022-07-22 DIAGNOSIS — R33.9 URINARY RETENTION: Primary | ICD-10-CM

## 2022-07-22 LAB
ANION GAP SERPL CALC-SCNC: 14 MMOL/L (ref 3–11)
ANISOCYTOSIS PRESENCE IN BLOOD, ANALYZER: ABNORMAL
BACTERIA #/AREA URNS AUTO: ABNORMAL /HPF
BASOPHILS # BLD AUTO: 0 10*3/UL
BASOPHILS NFR BLD AUTO: 0.2 % (ref 0–2)
BILIRUB UR QL STRIP.AUTO: NEGATIVE
BUN SERPL-MCNC: 13 MG/DL (ref 7–25)
CALCIUM SERPL-MCNC: 9.6 MG/DL (ref 8.6–10.3)
CHLORIDE SERPL-SCNC: 97 MMOL/L (ref 98–107)
CLARITY UR: ABNORMAL
CO2 SERPL-SCNC: 22 MMOL/L (ref 21–32)
COLOR UR: YELLOW
CREAT SERPL-MCNC: 1.29 MG/DL (ref 0.7–1.3)
EOSINOPHIL # BLD AUTO: 0 10*3/UL
EOSINOPHIL NFR BLD AUTO: 0.3 % (ref 0–3)
ERYTHROCYTE [DISTWIDTH] IN BLOOD BY AUTOMATED COUNT: 19.5 % (ref 11.5–15)
GFR SERPL CREATININE-BSD FRML MDRD: 56 ML/MIN/1.73M*2
GLUCOSE SERPL-MCNC: 105 MG/DL (ref 70–105)
GLUCOSE UR STRIP.AUTO-MCNC: NEGATIVE MG/DL
HCT VFR BLD AUTO: 38.2 % (ref 38–50)
HGB BLD-MCNC: 13.2 G/DL (ref 13.2–17.2)
HGB UR QL STRIP.AUTO: ABNORMAL
KETONES UR STRIP.AUTO-MCNC: NEGATIVE MG/DL
LEUKOCYTE ESTERASE UR QL STRIP: ABNORMAL
LYMPHOCYTES # BLD AUTO: 0.5 10*3/UL
LYMPHOCYTES NFR BLD AUTO: 4.6 % (ref 15–47)
MACROCYTOSIS PRESENCE IN BLOOD, ANALYZER: ABNORMAL
MCH RBC QN AUTO: 38.6 PG (ref 29–34)
MCHC RBC AUTO-ENTMCNC: 34.7 G/DL (ref 32–36)
MCV RBC AUTO: 111.4 FL (ref 82–97)
MONOCYTES # BLD AUTO: 0.6 10*3/UL
MONOCYTES NFR BLD AUTO: 4.9 % (ref 5–13)
NEUTROPHILS # BLD AUTO: 10.4 10*3/UL
NEUTROPHILS NFR BLD AUTO: 90 % (ref 46–70)
NITRITE UR QL STRIP.AUTO: NEGATIVE
PH UR STRIP.AUTO: 6 PH
PLATELET # BLD AUTO: 235 10*3/UL (ref 130–350)
PMV BLD AUTO: 6.3 FL (ref 6.9–10.8)
POTASSIUM SERPL-SCNC: 4.1 MMOL/L (ref 3.5–5.1)
PROT UR STRIP.AUTO-MCNC: 100 MG/DL
RBC # BLD AUTO: 3.43 10*6/ΜL (ref 4.1–5.8)
RBC #/AREA URNS AUTO: ABNORMAL /HPF
SODIUM SERPL-SCNC: 133 MMOL/L (ref 135–145)
SP GR UR STRIP.AUTO: 1.01 (ref 1–1.03)
SQUAMOUS #/AREA URNS AUTO: NEGATIVE /HPF
UROBILINOGEN UR STRIP.AUTO-MCNC: <2 E.U./DL
WBC # BLD AUTO: 11.6 10*3/UL (ref 3.7–9.6)
WBC #/AREA URNS AUTO: >100 /HPF
WBC CLUMPS #/AREA URNS AUTO: PRESENT /HPF

## 2022-07-22 PROCEDURE — 51798 US URINE CAPACITY MEASURE: CPT

## 2022-07-22 PROCEDURE — 80048 BASIC METABOLIC PNL TOTAL CA: CPT | Performed by: EMERGENCY MEDICINE

## 2022-07-22 PROCEDURE — 85025 COMPLETE CBC W/AUTO DIFF WBC: CPT | Performed by: EMERGENCY MEDICINE

## 2022-07-22 PROCEDURE — 2500000200 HC RX 250 WO HCPCS: Performed by: EMERGENCY MEDICINE

## 2022-07-22 PROCEDURE — 36415 COLL VENOUS BLD VENIPUNCTURE: CPT | Performed by: EMERGENCY MEDICINE

## 2022-07-22 PROCEDURE — 81001 URINALYSIS AUTO W/SCOPE: CPT | Performed by: EMERGENCY MEDICINE

## 2022-07-22 PROCEDURE — 51702 INSERT TEMP BLADDER CATH: CPT

## 2022-07-22 PROCEDURE — 99283 EMERGENCY DEPT VISIT LOW MDM: CPT

## 2022-07-22 PROCEDURE — 99284 EMERGENCY DEPT VISIT MOD MDM: CPT | Performed by: EMERGENCY MEDICINE

## 2022-07-22 RX ORDER — CEPHALEXIN 500 MG/1
500 CAPSULE ORAL 4 TIMES DAILY
Qty: 28 CAPSULE | Refills: 0 | Status: SHIPPED | OUTPATIENT
Start: 2022-07-22 | End: 2022-07-29

## 2022-07-22 RX ORDER — ASPIRIN 81 MG/1
81 TABLET ORAL DAILY
COMMUNITY

## 2022-07-22 RX ORDER — DOCUSATE SODIUM 100 MG/1
100 CAPSULE, LIQUID FILLED ORAL 2 TIMES DAILY
COMMUNITY

## 2022-07-22 RX ORDER — ACETAMINOPHEN 500 MG
500 TABLET ORAL EVERY 6 HOURS PRN
COMMUNITY

## 2022-07-22 RX ORDER — TAMSULOSIN HYDROCHLORIDE 0.4 MG/1
CAPSULE ORAL
COMMUNITY

## 2022-07-22 RX ORDER — LIDOCAINE HYDROCHLORIDE 20 MG/ML
6 JELLY TOPICAL ONCE AS NEEDED
Status: DISCONTINUED | OUTPATIENT
Start: 2022-07-22 | End: 2022-07-22 | Stop reason: HOSPADM

## 2022-07-22 RX ADMIN — LIDOCAINE HYDROCHLORIDE 6 ML: 20 JELLY TOPICAL at 12:55

## 2022-07-22 NOTE — ED PROVIDER NOTES
Room: 11    HPI:  Chief Complaint   Patient presents with   • Urinary Retention     Patient presents to triage for urinary retention, constipation, UTI     HPI  Patient presents for evaluation of urinary retention.  He had urine retention earlier this month with a UTI and was in the hospital in [place].  He was discharged 10 days ago.  He finished his course of Keflex and traveled here for a birthday.  Through the night he has had troubles urinating and only able to urinate a small amount and then less this morning.  He has suprapubic discomfort.  He denies nausea or vomiting.  He has not had fever.  Has not had cough.    HISTORY:  Past Medical History:   Diagnosis Date   • Colon cancer (CMS/HCC) (HCC)    • Hypertension    • Seizures (CMS/HCC) (HCC)        Past Surgical History:   Procedure Laterality Date   • COLECTOMY     • KNEE SURGERY         History reviewed. No pertinent family history.    Social History     Tobacco Use   • Smoking status: Former Smoker   • Smokeless tobacco: Never Used   Substance Use Topics   • Alcohol use: Yes       ROS:  Constitutional: Negative for fever.   HENT: Negative for sore throat.    Eyes: Negative for pain.   Respiratory: Negative for shortness of breath.    Cardiovascular: Negative for chest pain.   Gastrointestinal: Negative for abdominal pain.   Endocrine: Urinary retention urinary retention  Genitourinary: Negative for flank pain.   Musculoskeletal: Negative for back pain.   Neurological: Negative for headaches.   Hematological: Negative for bleeding.    PHYSICAL EXAM:  ED Triage Vitals   Temp Heart Rate Resp BP SpO2   07/22/22 1202 07/22/22 1202 07/22/22 1202 07/22/22 1202 07/22/22 1202   36.6 °C (97.9 °F) 117 15 152/80 98 %      Mean BP (mmHg) Temp Source Heart Rate Source Patient Position BP Location   07/22/22 1225 07/22/22 1202 -- 07/22/22 1225 --   127 Oral  Sitting       FiO2 (%)       --                Nursing note and vitals reviewed.  Constitutional: appears  well-developed.   HENT: Oral mucosa moist  Head: Normocephalic and atraumatic.   Eyes: Pupils are equal, round, .   Neck: Supple,   Cardiovascular: Regular rate and rhythm with no murmur, rub, or gallop.  Normal pulses.  Pulmonary/Chest: No respiratory distress.  Clear to auscultation bilaterally.  Abdominal: Soft and nontender.  Mild suprapubic distention and tenderness  Back: No CVA tenderness.  Musculoskeletal: No edema  Neurological: Alert.  No focal deficits or weakness  Skin: Skin is warm and dry. No rash noted.   Psychiatric: Normal mood and affect.    Labs Reviewed   CBC WITH AUTO DIFFERENTIAL - Abnormal       Result Value    WBC 11.6 (*)     RBC 3.43 (*)     Hemoglobin 13.2      Hematocrit 38.2      .4 (*)     MCH 38.6 (*)     MCHC 34.7      RDW 19.5 (*)     Platelets 235      MPV 6.3 (*)     Neutrophils% 90.0 (*)     Lymphocytes% 4.6 (*)     Monocytes% 4.9 (*)     Eosinophils% 0.3      Basophils% 0.2      ANC (auto diff) 10.40      Lymphocytes Absolute 0.50      Monocytes Absolute 0.60      Eosinophils Absolute 0.00      Basophils Absolute 0.00      Anisocytosis 1+      Macrocytosis 3+     BASIC METABOLIC PANEL - Abnormal    Sodium 133 (*)     Potassium 4.1      Chloride 97 (*)     CO2 22      BUN 13      Creatinine 1.29      Glucose 105      Calcium 9.6      Anion Gap 14 (*)     eGFR 56 (*)     Narrative:     Calculation based on the 2021 Chronic Kidney Disease Epidemiology Collaboration (CKD-EPI) equation refit without adjustment for race.   URINALYSIS, MICROSCOPIC ONLY - Abnormal    RBC, Urine 50-75 (*)     WBC, Urine >100 (*)     WBC Clumps, Urine Present (*)     Squamous Epithelial, Urine Negative      Bacteria, Urine Many (*)    URINALYSIS, DIPSTICK ONLY, FOR USE WITH MICROSCOPIC PANEL - Abnormal    Color, Urine Yellow      Clarity, Urine Turbid (*)     Specific Gravity, Urine 1.008      Leukocytes, Urine Large (*)     Nitrite, Urine Negative      Protein, Urine 100  (*)     Ketones, Urine  Negative      Urobilinogen, Urine <2.0      Bilirubin, Urine Negative      Blood, Urine Moderate (*)     Glucose, Urine Negative      pH, Urine 6.0     URINALYSIS WITH MICROSCOPIC    Narrative:     The following orders were created for panel order Urinalysis w/microscopic Urine, Clean Catch.  Procedure                               Abnormality         Status                     ---------                               -----------         ------                     Urinalysis, microscopic U...[[id number]]  Abnormal            Final result               Urinalysis, dipstick Urin...[[id number]]  Abnormal            Final result                 Please view results for these tests on the individual orders.         No orders to display         ED Medication Administration from 07/22/2022 1158 to 07/22/2022 1518       Date/Time Order Dose Route Action Action by     07/22/2022 1255 lidocaine HCL (GLYDO) 2 % jelly in applicator 6 mL 6 mL intra-urethral Given CARLENE Munguia            PROCEDURES:  Procedures      ED COURSE:  ED Course as of 08/10/22 1445   Fri Jul 22, 2022   1443 Patient has some signs of early UTI.  He feels well otherwise.  Catheter is in.  At this time I offered hospitalization but he would like to go.  He will be discharged with leg bag and antibiotic and follow-up upon return home. [BE]      ED Course User Index  [BE] Hilaria Roland MD       Sepsis Quality Bundle      MDM:     Patient presents with some suprapubic discomfort and urinary retention.  He had urine retention 1 month ago was [place] out [place].  I did review his records earlier this month he acute his cystitis and hematuria and urinary retention.  He was discharged with Keflex at that time.  He is here traveling.  He denies fever or vomiting or back pain.  Catheter was placed and urine was drained.  This urine does show some signs of infection.  His renal function is normal.  He has no significant leukocytosis or anemia.  He has no  electrolyte abnormalities.  He does not appear to be septic.  At this time I did offer hospitalization but he wishes to go.  He will be discharged with catheter and leg bag and antibiotics and close follow-up.      CLINICAL IMPRESSION:  Final diagnoses:   [R33.9] Urinary retention   UTI  Suprapubic discomfort  History of colon cancer  Seizure disorder  Hypertension      A voice recognition program was used to aid in documentation of this record.  Sometimes words are not printed exactly as they were spoken.  While efforts were made to carefully edit and correct any inaccuracies, some areas may be present; please take these into context.  Please contact the provider if areas are identified.             Hilaria Roland MD  08/10/22 3007

## 2022-07-22 NOTE — DISCHARGE INSTRUCTIONS
Your urine shows some signs of infection.    Take antibiotic as directed.  Return if new or worsening symptoms or concerns.  Follow-up with your doctor for close reevaluation.

## 2023-06-19 ENCOUNTER — APPOINTMENT (OUTPATIENT)
Dept: CT IMAGING | Facility: HOSPITAL | Age: 78
End: 2023-06-19
Attending: STUDENT IN AN ORGANIZED HEALTH CARE EDUCATION/TRAINING PROGRAM
Payer: COMMERCIAL

## 2023-06-19 ENCOUNTER — APPOINTMENT (OUTPATIENT)
Dept: RADIOLOGY | Facility: HOSPITAL | Age: 78
End: 2023-06-19
Attending: PHYSICIAN ASSISTANT
Payer: COMMERCIAL

## 2023-06-19 ENCOUNTER — TELEPHONE (OUTPATIENT)
Dept: NEUROSURGERY | Facility: CLINIC | Age: 78
End: 2023-06-19

## 2023-06-19 ENCOUNTER — HOSPITAL ENCOUNTER (EMERGENCY)
Facility: HOSPITAL | Age: 78
Discharge: HOME | End: 2023-06-19
Attending: STUDENT IN AN ORGANIZED HEALTH CARE EDUCATION/TRAINING PROGRAM | Admitting: STUDENT IN AN ORGANIZED HEALTH CARE EDUCATION/TRAINING PROGRAM
Payer: COMMERCIAL

## 2023-06-19 ENCOUNTER — APPOINTMENT (OUTPATIENT)
Dept: MRI IMAGING | Facility: HOSPITAL | Age: 78
End: 2023-06-19
Attending: STUDENT IN AN ORGANIZED HEALTH CARE EDUCATION/TRAINING PROGRAM
Payer: COMMERCIAL

## 2023-06-19 VITALS
HEART RATE: 85 BPM | OXYGEN SATURATION: 94 % | SYSTOLIC BLOOD PRESSURE: 177 MMHG | RESPIRATION RATE: 17 BRPM | BODY MASS INDEX: 21.97 KG/M2 | WEIGHT: 136.69 LBS | DIASTOLIC BLOOD PRESSURE: 80 MMHG | HEIGHT: 66 IN | TEMPERATURE: 97.5 F

## 2023-06-19 DIAGNOSIS — S22.089A CLOSED T12 FRACTURE (H): Primary | ICD-10-CM

## 2023-06-19 DIAGNOSIS — N39.0 URINARY TRACT INFECTION WITHOUT HEMATURIA, SITE UNSPECIFIED: ICD-10-CM

## 2023-06-19 DIAGNOSIS — S22.080A COMPRESSION FRACTURE OF T12 VERTEBRA, INITIAL ENCOUNTER (H): ICD-10-CM

## 2023-06-19 PROBLEM — G81.91 HEMIPLEGIA AFFECTING RIGHT DOMINANT SIDE, UNSPECIFIED ETIOLOGY, UNSPECIFIED HEMIPLEGIA TYPE (H): Status: ACTIVE | Noted: 2022-05-11

## 2023-06-19 PROBLEM — D72.829 LEUKOCYTOSIS: Status: ACTIVE | Noted: 2023-06-19

## 2023-06-19 PROBLEM — N35.011 TRAUMATIC BULBOUS URETHRAL STRICTURE: Status: ACTIVE | Noted: 2022-09-06

## 2023-06-19 PROBLEM — I10 HYPERTENSION: Status: ACTIVE | Noted: 2021-09-22

## 2023-06-19 PROBLEM — R33.9 RETENTION OF URINE: Status: ACTIVE | Noted: 2022-09-06

## 2023-06-19 LAB
ALBUMIN SERPL BCG-MCNC: 4.2 G/DL (ref 3.5–5.2)
ALBUMIN UR-MCNC: 30 MG/DL
ALP SERPL-CCNC: 100 U/L (ref 40–129)
ALT SERPL W P-5'-P-CCNC: 12 U/L (ref 0–70)
ANION GAP SERPL CALCULATED.3IONS-SCNC: 14 MMOL/L (ref 7–15)
APPEARANCE UR: ABNORMAL
AST SERPL W P-5'-P-CCNC: 22 U/L (ref 0–45)
BACTERIA #/AREA URNS HPF: ABNORMAL /HPF
BASOPHILS # BLD AUTO: 0 10E3/UL (ref 0–0.2)
BASOPHILS NFR BLD AUTO: 0 %
BILIRUB SERPL-MCNC: 0.9 MG/DL
BILIRUB UR QL STRIP: NEGATIVE
BUN SERPL-MCNC: 10.2 MG/DL (ref 8–23)
CALCIUM SERPL-MCNC: 9.7 MG/DL (ref 8.8–10.2)
CHLORIDE SERPL-SCNC: 106 MMOL/L (ref 98–107)
COLOR UR AUTO: YELLOW
CREAT SERPL-MCNC: 1.17 MG/DL (ref 0.67–1.17)
DEPRECATED HCO3 PLAS-SCNC: 22 MMOL/L (ref 22–29)
EOSINOPHIL # BLD AUTO: 0 10E3/UL (ref 0–0.7)
EOSINOPHIL NFR BLD AUTO: 0 %
ERYTHROCYTE [DISTWIDTH] IN BLOOD BY AUTOMATED COUNT: 13.9 % (ref 10–15)
GFR SERPL CREATININE-BSD FRML MDRD: 63 ML/MIN/1.73M2
GLUCOSE SERPL-MCNC: 105 MG/DL (ref 70–99)
GLUCOSE UR STRIP-MCNC: NEGATIVE MG/DL
HCT VFR BLD AUTO: 43.1 % (ref 40–53)
HGB BLD-MCNC: 15.4 G/DL (ref 13.3–17.7)
HGB UR QL STRIP: ABNORMAL
IMM GRANULOCYTES # BLD: 0.1 10E3/UL
IMM GRANULOCYTES NFR BLD: 1 %
KETONES UR STRIP-MCNC: ABNORMAL MG/DL
LACTATE SERPL-SCNC: 2 MMOL/L (ref 0.7–2)
LEUKOCYTE ESTERASE UR QL STRIP: ABNORMAL
LYMPHOCYTES # BLD AUTO: 0.5 10E3/UL (ref 0.8–5.3)
LYMPHOCYTES NFR BLD AUTO: 6 %
MCH RBC QN AUTO: 40.3 PG (ref 26.5–33)
MCHC RBC AUTO-ENTMCNC: 35.7 G/DL (ref 31.5–36.5)
MCV RBC AUTO: 113 FL (ref 78–100)
MONOCYTES # BLD AUTO: 0.8 10E3/UL (ref 0–1.3)
MONOCYTES NFR BLD AUTO: 9 %
MUCOUS THREADS #/AREA URNS LPF: PRESENT /LPF
NEUTROPHILS # BLD AUTO: 7.3 10E3/UL (ref 1.6–8.3)
NEUTROPHILS NFR BLD AUTO: 84 %
NITRATE UR QL: NEGATIVE
NRBC # BLD AUTO: 0 10E3/UL
NRBC BLD AUTO-RTO: 0 /100
PH UR STRIP: 6.5 [PH] (ref 5–7)
PLATELET # BLD AUTO: 179 10E3/UL (ref 150–450)
POTASSIUM SERPL-SCNC: 3.9 MMOL/L (ref 3.4–5.3)
PROT SERPL-MCNC: 6.9 G/DL (ref 6.4–8.3)
RBC # BLD AUTO: 3.82 10E6/UL (ref 4.4–5.9)
RBC URINE: 3 /HPF
SODIUM SERPL-SCNC: 142 MMOL/L (ref 136–145)
SP GR UR STRIP: 1.02 (ref 1–1.03)
UROBILINOGEN UR STRIP-MCNC: <2 MG/DL
WBC # BLD AUTO: 8.7 10E3/UL (ref 4–11)
WBC CLUMPS #/AREA URNS HPF: PRESENT /HPF
WBC URINE: 163 /HPF

## 2023-06-19 PROCEDURE — 87086 URINE CULTURE/COLONY COUNT: CPT | Performed by: STUDENT IN AN ORGANIZED HEALTH CARE EDUCATION/TRAINING PROGRAM

## 2023-06-19 PROCEDURE — 99283 EMERGENCY DEPT VISIT LOW MDM: CPT | Performed by: PHYSICIAN ASSISTANT

## 2023-06-19 PROCEDURE — 72131 CT LUMBAR SPINE W/O DYE: CPT

## 2023-06-19 PROCEDURE — 250N000013 HC RX MED GY IP 250 OP 250 PS 637: Performed by: STUDENT IN AN ORGANIZED HEALTH CARE EDUCATION/TRAINING PROGRAM

## 2023-06-19 PROCEDURE — 85025 COMPLETE CBC W/AUTO DIFF WBC: CPT | Performed by: STUDENT IN AN ORGANIZED HEALTH CARE EDUCATION/TRAINING PROGRAM

## 2023-06-19 PROCEDURE — 72146 MRI CHEST SPINE W/O DYE: CPT

## 2023-06-19 PROCEDURE — 72100 X-RAY EXAM L-S SPINE 2/3 VWS: CPT

## 2023-06-19 PROCEDURE — 72128 CT CHEST SPINE W/O DYE: CPT

## 2023-06-19 PROCEDURE — 70450 CT HEAD/BRAIN W/O DYE: CPT

## 2023-06-19 PROCEDURE — 81001 URINALYSIS AUTO W/SCOPE: CPT | Performed by: STUDENT IN AN ORGANIZED HEALTH CARE EDUCATION/TRAINING PROGRAM

## 2023-06-19 PROCEDURE — 99285 EMERGENCY DEPT VISIT HI MDM: CPT | Mod: 25

## 2023-06-19 PROCEDURE — 96375 TX/PRO/DX INJ NEW DRUG ADDON: CPT

## 2023-06-19 PROCEDURE — 72192 CT PELVIS W/O DYE: CPT

## 2023-06-19 PROCEDURE — 72125 CT NECK SPINE W/O DYE: CPT

## 2023-06-19 PROCEDURE — 36415 COLL VENOUS BLD VENIPUNCTURE: CPT | Performed by: STUDENT IN AN ORGANIZED HEALTH CARE EDUCATION/TRAINING PROGRAM

## 2023-06-19 PROCEDURE — 250N000011 HC RX IP 250 OP 636: Performed by: STUDENT IN AN ORGANIZED HEALTH CARE EDUCATION/TRAINING PROGRAM

## 2023-06-19 PROCEDURE — 80053 COMPREHEN METABOLIC PANEL: CPT | Performed by: STUDENT IN AN ORGANIZED HEALTH CARE EDUCATION/TRAINING PROGRAM

## 2023-06-19 PROCEDURE — 83605 ASSAY OF LACTIC ACID: CPT | Performed by: STUDENT IN AN ORGANIZED HEALTH CARE EDUCATION/TRAINING PROGRAM

## 2023-06-19 PROCEDURE — 96365 THER/PROPH/DIAG IV INF INIT: CPT

## 2023-06-19 RX ORDER — KETOROLAC TROMETHAMINE 15 MG/ML
15 INJECTION, SOLUTION INTRAMUSCULAR; INTRAVENOUS ONCE
Status: COMPLETED | OUTPATIENT
Start: 2023-06-19 | End: 2023-06-19

## 2023-06-19 RX ORDER — CEPHALEXIN 500 MG/1
500 CAPSULE ORAL 4 TIMES DAILY
Qty: 40 CAPSULE | Refills: 0 | Status: SHIPPED | OUTPATIENT
Start: 2023-06-19 | End: 2023-06-29

## 2023-06-19 RX ORDER — CEFTRIAXONE 2 G/1
2 INJECTION, POWDER, FOR SOLUTION INTRAMUSCULAR; INTRAVENOUS ONCE
Status: COMPLETED | OUTPATIENT
Start: 2023-06-19 | End: 2023-06-19

## 2023-06-19 RX ORDER — HYDROCODONE BITARTRATE AND ACETAMINOPHEN 5; 325 MG/1; MG/1
1 TABLET ORAL EVERY 6 HOURS PRN
Qty: 12 TABLET | Refills: 0 | Status: SHIPPED | OUTPATIENT
Start: 2023-06-19 | End: 2023-06-22

## 2023-06-19 RX ORDER — MORPHINE SULFATE 2 MG/ML
2 INJECTION, SOLUTION INTRAMUSCULAR; INTRAVENOUS ONCE
Status: COMPLETED | OUTPATIENT
Start: 2023-06-19 | End: 2023-06-19

## 2023-06-19 RX ORDER — MAGNESIUM HYDROXIDE/ALUMINUM HYDROXICE/SIMETHICONE 120; 1200; 1200 MG/30ML; MG/30ML; MG/30ML
30 SUSPENSION ORAL ONCE
Status: COMPLETED | OUTPATIENT
Start: 2023-06-19 | End: 2023-06-19

## 2023-06-19 RX ADMIN — KETOROLAC TROMETHAMINE 15 MG: 15 INJECTION INTRAMUSCULAR; INTRAVENOUS at 15:07

## 2023-06-19 RX ADMIN — MORPHINE SULFATE 2 MG: 2 INJECTION, SOLUTION INTRAMUSCULAR; INTRAVENOUS at 15:08

## 2023-06-19 RX ADMIN — CEFTRIAXONE SODIUM 2 G: 2 INJECTION, POWDER, FOR SOLUTION INTRAMUSCULAR; INTRAVENOUS at 11:14

## 2023-06-19 RX ADMIN — ALUMINUM HYDROXIDE, MAGNESIUM HYDROXIDE, AND SIMETHICONE 30 ML: 200; 200; 20 SUSPENSION ORAL at 12:59

## 2023-06-19 ASSESSMENT — ACTIVITIES OF DAILY LIVING (ADL)
ADLS_ACUITY_SCORE: 38

## 2023-06-19 ASSESSMENT — ENCOUNTER SYMPTOMS
BACK PAIN: 1
ABDOMINAL PAIN: 0

## 2023-06-19 NOTE — DISCHARGE INSTRUCTIONS
You have a bladder infection and will need antibiotics.  They have been perscribed.  Please take them and follow up with your primary physician within 1 week.    For your back fracture.  You have a small compression of the T12 vertebrae.  This is a stable fracture but you will need to wear the brace when upright.      You will also need to follow up with Neurosurgery.  Your insurance is not accepted by [facility] so you will need to be seen by the [facility] Neurosurgery clinic.  A referral has been placed.  You should hear from them in the next day or two.    If you do not, please call your primary doctor who may assist.

## 2023-06-19 NOTE — ED NOTES
[facility] has been in contact with [facility] personnel to complete teaching and fitting of brace. Family updated on wait time.

## 2023-06-19 NOTE — TELEPHONE ENCOUNTER
PATIENT NAME:  Claus Chambers  YOB: 1945  MRN: [id number]  SURGEON: Dr. Byrne  DATE of CONSULT: 06/19/2023  Consult for T12 fracture    FOLLOW-UP PLAN:    Hospital Follow Up Visit: 4 weeks  Provider: DEXTER on Dr. Byrne clinic day    DIAGNOSTICS: XR  DISPOSITION: pending     ADDITIONAL INSTRUCTIONS FOR MEDICAL STAFF:      Jesica Reid RN, CNRN

## 2023-06-19 NOTE — ED PROVIDER NOTES
EMERGENCY DEPARTMENT ENCOUNTER      NAME: Claus Chambers  AGE: 78 year old male  YOB: 1945  MRN: [id number]  EVALUATION DATE & TIME: 6/19/2023  7:47 AM    PCP: Vladimir Quiñones    ED PROVIDER: Kirit Gallego M.D.      Chief Complaint   Patient presents with     Fall     Back Pain         FINAL IMPRESSION:  1. Urinary tract infection without hematuria, site unspecified    2. Compression fracture of T12 vertebra, initial encounter (H)          ED COURSE & MEDICAL DECISION MAKING:    Pertinent Labs & Imaging studies reviewed. (See chart for details)  78 year old male presents to the Emergency Department for evaluation of back pain.  Patient had a fall earlier today presenting with present by ambulance.  In the ER patient was found to have a urinary tract infection which appears to be relatively asymptomatic.  Patient did receive a dose of ceftriaxone here in the department.  No signs of sepsis or urosepsis.  Will discharge patient with a prescription for Keflex.  Patient had pain in his lower back as result of the CT scan of his cervical thoracic and lumbar spine which did identify a T12 endplate compression fracture.  Spoke with neurosurgery during this and they felt that fitting the brace will be appropriate and then follow-up as outpatient.  Patient did not have any neuro symptoms or anything to suggest spinal cord injury.  His pain was relatively well controlled here in the ER.  No other injury identified.  I considered admission for pain control but ultimately felt that based on conversations with the patient and his wife that discharge was appropriate.  Patient currently is awaiting the brace company to be fitted with his brace and will be signed out to my colleague pending that disposition.    At the conclusion of the encounter I discussed the results of all of the tests and the disposition. The questions were answered. The patient or family acknowledged understanding and was agreeable with the  care plan.     8:11 AM I met with the patient, obtained history, performed an initial exam, and discussed options and plan for diagnostics and treatment here in the ED.  10:43 AM I spoke with Dr. Fuentes, Neurosurgery, about the patient. They recommends an MRI.  1:02 PM I spoke with the patient and the patient's daughter and got an updated history.      ED Course as of 06/28/23 0313   Mon Jun 19, 2023   1026 Patient has UTI on urinalysis.  CT scan was a bit abnormal which may just represent a UTI.  There is a possible endplate fracture additionally at T12 no other acute fractures.  Will speak with neurosurgery and we will draw some basic blood work to ensure there is no sepsis or significant electrolyte derangement.  We will give a dose of ceftriaxone while here in the ER.   1308 Spoke with neurosurgeon NP who had been talking with the patient the patient now endorses that he does have some neck pain since his fall.  In addition he is got a small lipoma to the back of his scalp which has been there for quite some time but seems a bit more red than usual.  With requestioning I cannot adequately rule out any sort of head or neck trauma based on history so therefore will pursue CT scan of the head and neck pending brace fitting.           Medical Decision Making    History:    Supplemental history from: EMS    External Record(s) reviewed: Documented in chart, if applicable.    Work Up:    Chart documentation includes differential considered and any EKGs or imaging independently interpreted by provider, where specified.    In additional to work up documented, I considered the following work up: Documented in chart, if applicable.    External consultation:    Discussion of management with another provider: Neurosurgery    Complicating factors:    Care impacted by chronic illness: Hypertension and Other: Malignant neoplasm of colon, leukocytosis, hemiplegia    Care affected by social determinants of health:  N/A    Disposition considerations: Discharge. I prescribed additional prescription strength medication(s) as charted. See documentation for any additional details.           minutes of critical care time     MEDICATIONS GIVEN IN THE EMERGENCY:  Medications   cefTRIAXone (ROCEPHIN) 2 g vial to attach to  ml bag for ADULTS or NS 50 ml bag for PEDS (0 g Intravenous Stopped 6/19/23 1141)   alum & mag hydroxide-simethicone (MAALOX) suspension 30 mL (30 mLs Oral $Given 6/19/23 1252)   ketorolac (TORADOL) injection 15 mg (15 mg Intravenous $Given 6/19/23 1507)   morphine (PF) injection 2 mg (2 mg Intravenous $Given 6/19/23 1508)       NEW PRESCRIPTIONS STARTED AT TODAY'S ER VISIT  Discharge Medication List as of 6/19/2023  6:28 PM      START taking these medications    Details   cephALEXin (KEFLEX) 500 MG capsule Take 1 capsule (500 mg) by mouth 4 times daily for 10 days, Disp-40 capsule, R-0, Local Print      HYDROcodone-acetaminophen (NORCO) 5-325 MG tablet Take 1 tablet by mouth every 6 hours as needed for pain, Disp-12 tablet, R-0, Local Print                =================================================================    HPI    Patient information was obtained from: Patient and EMS    Use of : N/A        Claus Chambers is a 78 year old male with a pertinent history of hypertension, hemiplegia, and malignant neoplasm of colon who presents for evaluation of a fall.    Per Ems: Patient tripped over a slipper this morning and landed on his coccyx. Patient denies an loss of consciousness, hitting his head, or taking blood thinners. EMS gave the patient 50 mcg of fentanyl which reduced is pain.    Per Patient: Patient reports tripping on his slipper this morning and landing on the small of his back. He was able to get up after the fall but had severe back pain. Now, in ED, his back pain has subsided mostly and he is feeling better. Patient also reports neck pain on his right side. He has a history of  breaking his neck in his 20's.He denies taking a blood thinner, having abdominal pain, or any other complaints at this time.       REVIEW OF SYSTEMS   Review of Systems   Gastrointestinal: Negative for abdominal pain.   Musculoskeletal: Positive for back pain.   All other systems reviewed and are negative.       PAST MEDICAL HISTORY:  History reviewed. No pertinent past medical history.    PAST SURGICAL HISTORY:  Past Surgical History:   Procedure Laterality Date     COLON SURGERY Right 2015     COLONOSCOPY N/A 3/5/2015    Procedure: COMBINED COLONOSCOPY, SINGLE OR MULTIPLE BIOPSY/POLYPECTOMY BY BIOPSY;  Surgeon: Elliot Edmonds MD;  Location: [facility]     COLONOSCOPY N/A 2016    Procedure: COLONOSCOPY;  Surgeon: Elliot Edmonds MD;  Location: [facility]     COLONOSCOPY N/A 3/6/2017    Procedure: COLONOSCOPY;  Surgeon: Elliot Edmonds MD;  Location: [facility]     HEMORRHOIDECTOMY EXTERNAL       JOINT REPLACEMENT Right     knee           CURRENT MEDICATIONS:    cephALEXin (KEFLEX) 500 MG capsule  AMLODIPINE BESYLATE 10 MG OR TABS  ASPIRIN 325 MG OR TABS  hydroxyurea (HYDREA) 500 MG capsule  LISINOPRIL PO  loperamide (IMODIUM A-D) 2 MG tablet  psyllium 0.52 G capsule  senna-docusate (SENOKOT-S;PERICOLACE) 8.6-50 MG per tablet  TYLENOL COLD MULTI-SYMPTOM 5--325 MG OR TABS        ALLERGIES:  Allergies   Allergen Reactions     Nka [No Known Allergies]        FAMILY HISTORY:  Family History   Problem Relation Age of Onset     Dementia Mother      Coronary Artery Disease Father      Cerebrovascular Disease Father      Pneumonia Father        SOCIAL HISTORY:   Social History     Socioeconomic History     Marital status:      Spouse name: None     Number of children: None     Years of education: None     Highest education level: None   Tobacco Use     Smoking status: Former     Types: Cigarettes     Quit date: 2013     Years since quittin.5       VITALS:  BP (!) 177/80   Pulse 85   Temp 97.5  F  "(36.4  C)   Resp 17   Ht 1.676 m (5' 6\")   Wt 62 kg (136 lb 11 oz)   SpO2 94%   BMI 22.06 kg/m        PHYSICAL EXAM    Constitutional: Well developed, Well nourished, NAD, GCS 15  HENT: Normocephalic, Atraumatic, Bilateral external ears normal, Oropharynx normal, mucous membranes moist, Nose normal. Neck-  Normal range of motion, No tenderness, Supple, No stridor.  Eyes: PERRL, EOMI, Conjunctiva normal, No discharge.   Respiratory: Normal breath sounds, No respiratory distress, No wheezing, Speaks full sentences easily. No cough.  Cardiovascular: Normal heart rate, Regular rhythm, No murmurs, No rubs, No gallops. Chest wall nontender.  GI:Soft, No tenderness, No masses, No flank tenderness. No rebound or guarding.   Musculoskeletal: 2+ DP pulses. No edema.No cyanosis, No clubbing. Good range of motion in all major joints. No tenderness to palpation or major deformities noted. Pain at midline, lower thorax, and lumbar spine.  Integument: Warm, Dry, No erythema, No rash. No petechiae.   Neurologic: Alert & oriented x 3,  CN 3-12 intact Normal motor function, Normal sensory function, No focal deficits noted. Normal gait. Normal finger to nose bilaterally  Psychiatric: Affect normal, Judgment normal, Mood normal. Cooperative.          LAB:  All pertinent labs reviewed and interpreted.  Labs Ordered and Resulted from Time of ED Arrival to Time of ED Departure   ROUTINE UA WITH MICROSCOPIC REFLEX TO CULTURE - Abnormal       Result Value    Color Urine Yellow      Appearance Urine Slightly Cloudy (*)     Glucose Urine Negative      Bilirubin Urine Negative      Ketones Urine Trace (*)     Specific Gravity Urine 1.016      Blood Urine 0.03 mg/dL (*)     pH Urine 6.5      Protein Albumin Urine 30 (*)     Urobilinogen Urine <2.0      Nitrite Urine Negative      Leukocyte Esterase Urine 500 Day/uL (*)     Bacteria Urine Moderate (*)     WBC Clumps Urine Present (*)     Mucus Urine Present (*)     RBC Urine 3 (*)     WBC " Urine 163 (*)    COMPREHENSIVE METABOLIC PANEL - Abnormal    Sodium 142      Potassium 3.9      Chloride 106      Carbon Dioxide (CO2) 22      Anion Gap 14      Urea Nitrogen 10.2      Creatinine 1.17      Calcium 9.7      Glucose 105 (*)     Alkaline Phosphatase 100      AST 22      ALT 12      Protein Total 6.9      Albumin 4.2      Bilirubin Total 0.9      GFR Estimate 63     CBC WITH PLATELETS AND DIFFERENTIAL - Abnormal    WBC Count 8.7      RBC Count 3.82 (*)     Hemoglobin 15.4      Hematocrit 43.1       (*)     MCH 40.3 (*)     MCHC 35.7      RDW 13.9      Platelet Count 179      % Neutrophils 84      % Lymphocytes 6      % Monocytes 9      % Eosinophils 0      % Basophils 0      % Immature Granulocytes 1      NRBCs per 100 WBC 0      Absolute Neutrophils 7.3      Absolute Lymphocytes 0.5 (*)     Absolute Monocytes 0.8      Absolute Eosinophils 0.0      Absolute Basophils 0.0      Absolute Immature Granulocytes 0.1      Absolute NRBCs 0.0     LACTIC ACID WHOLE BLOOD - Normal    Lactic Acid 2.0         RADIOLOGY:  Reviewed all pertinent imaging. Please see official radiology report.  XR Lumbar Spine 2/3 Views   Final Result   IMPRESSION: 5 lumbar-type vertebral bodies. Mild left convexity lower lumbar curvature. Mild superior endplate compression of T12 is stable. Alignment is unchanged. Mild chronic T11 compression again noted. Marked L1-L2 and L4-L5 degenerative disc    disease and mild lower lumbar facet arthropathy.      Cervical spine CT w/o contrast   Final Result   IMPRESSION:   1.  No acute fracture.      Head CT w/o contrast   Final Result   IMPRESSION:   1.  No intracranial hemorrhage, mass lesions, hydrocephalus or CT evidence for an acute infarct.   2.  Mild diffuse cerebral parenchymal volume loss. Presumed chronic hypertensive/microvascular ischemic white matter changes.   3.  Chronic infarcts as detailed above.      Thoracic spine MRI w/o contrast   Final Result   IMPRESSION:   1.  The  previously described T12 superior endplate compression fracture on the earlier same day 06/19/2023 CT thoracic spine has an acute appearance on the current examination with associated bone edema. Overall mild height loss is unchanged compared to    the prior study. No retropulsion.      2.  At T12-L1, there is a left central/subarticular disc protrusion which deforms the left ventral cord and likely compresses the traversing left L1 nerve root.      Lumbar spine CT w/o contrast   Final Result   IMPRESSION:   1.  No evidence of acute displaced fracture.   2.  No evidence of traumatic subluxation.   3.  Multilevel degenerative change, including sites of high-grade spinal and neural foraminal stenosis as described.   4.  CT studies of the thoracic spine and bony pelvis are reported separately.      CT Pelvis Bone wo Contrast   Final Result    IMPRESSION:   1.  No evidence of an acute pelvic or hip fracture.   2.  Air within the bladder is nonspecific and can be seen with recent instrumentation however infection should be excluded. Correlation with recent instrumentation or Lombardi catheter placement is advised.         CT Thoracic Spine w/o Contrast   Final Result   IMPRESSION:   1.  Subtle new age-indeterminate fracture deformities along the T12 superior endplate, when compared to the study of 4/10/2017. Clinical correlation recommended. MRI may be useful for further characterization, if clinically appropriate.   2.  Mild progressive height loss of the T6, T8 and T9 vertebral body since 4/10/2017. These changes are favored to be degenerative, though subtle age-indeterminate fractures are not entirely excluded.   3.  No evidence for traumatic subluxation.   4.  Diffuse osteopenia.   5.  Degenerative change.             IGabriel, am serving as a scribe to document services personally performed by Dr. Kirit Gallego based on my observation and the provider's statements to me. IKirit MD attest that Gabriel  Dilip is acting in a scribe capacity, has observed my performance of the services and has documented them in accordance with my direction.    Kirit Gallego M.D.  Emergency Medicine  [facility] EMERGENCY DEPARTMENT  [address] [place] [id number] [phone]  Dept: [phone]      Kirit Gallego MD  06/28/23 0317

## 2023-06-19 NOTE — CONSULTS
[facility]    Neurosurgery Consultation     Date of Admission:  6/19/2023  Date of Consult (When I saw the patient): 06/19/23    Assessment & Plan   Claus Chambers is a 78 year old male who was admitted on 6/19/2023. I was asked to see the patient for back pain after a fall this morning. Noted to have acute T12 compression fracture, complaint of neck pain and dizziness without acute abnormalities of head or cervical CT    Active Problems:  T12 compression fracture     Plan:   Orthotics to fit patient with TLSO brace   Standing xray to be obtain once brace arrives  Would recommend follow up in 4 weeks with repeat xray at that time   Would expect brace to be worn 8-12 weeks or longer pending bone quality and fracture healing       I have discussed the following assessment and plan with Dr. Byrne who is in agreement with initial plan and will follow up with further consultation recommendations.    Gina Guevara PA-C  [facility] Neurosurgery  O: [phone]        Code Status    No Order    Reason for Consult   Reason for consult: I was asked by Dr. Gallego to evaluate this patient for back pain after a fall.    Primary Care Physician   Vladimir Quiñones    Chief Complaint   Back pain after fall this morning     History is obtained from the patient    History of Present Illness   Claus Chambers is a 78 year old male who presents with history of hypertension, hemiplegia, and malignant neoplasm of colon who presents for evaluation of a fall. He states that he was up this morning walking back to his recliner from the kitchen when he tripped on his slippers that were on the grown and caused him to fall backwards landing on his buttocks. States that he had sudden onset of severe mid to lower back pain but notes that he was able to get up from the ground. He denies any radicular lower extremity pain numbness tingling or weakness. He denies head trauma but is not sure that maybe he could of hit  his head noted red spot to right posterior scalp. He denies LOC. He has slight complaint of neck pain but notes that it is not nearly as severe as his back pain. He states that his neck pain extends to his right shoulder but denies further radicular pain numbness or weakness. He denies changes in bowel or bladder habits. He denies any headaches presently. He notes intermittent dizziness that will occur at random over the past 2-3 weeks and notes that one episode last week was accompanied with nausea and emesis. He denies any visual changes.     Past Medical History   I have reviewed this patient's medical history and updated it with pertinent information if needed.   History reviewed. No pertinent past medical history.    Past Surgical History   I have reviewed this patient's surgical history and updated it with pertinent information if needed.  Past Surgical History:   Procedure Laterality Date     COLON SURGERY Right 2015     COLONOSCOPY N/A 3/5/2015    Procedure: COMBINED COLONOSCOPY, SINGLE OR MULTIPLE BIOPSY/POLYPECTOMY BY BIOPSY;  Surgeon: Elliot Edmonds MD;  Location: [facility]     COLONOSCOPY N/A 2016    Procedure: COLONOSCOPY;  Surgeon: Elliot Edmonds MD;  Location: [facility]     COLONOSCOPY N/A 3/6/2017    Procedure: COLONOSCOPY;  Surgeon: Elliot Edmonds MD;  Location: [facility]     HEMORRHOIDECTOMY EXTERNAL       JOINT REPLACEMENT Right     knee       Prior to Admission Medications   Prior to Admission Medications   Prescriptions Last Dose Informant Patient Reported? Taking?   AMLODIPINE BESYLATE 10 MG OR TABS   Yes No   Si TABLET BY MOUTH DAILY   ASPIRIN 325 MG OR TABS   Yes No   Si TABLET BY MOUTH DAILY   LISINOPRIL PO   Yes No   Sig: Take 20 mg by mouth   TYLENOL COLD MULTI-SYMPTOM 5--325 MG OR TABS   Yes No   Sig: AS NEEDED   hydroxyurea (HYDREA) 500 MG capsule   Yes No   Sig: Take by mouth daily   loperamide (IMODIUM A-D) 2 MG tablet   No No   Sig: Take 1 tablet (2 mg) by mouth  "4 times daily as needed for diarrhea   psyllium 0.52 G capsule   Yes No   Sig: Take 1 capsule by mouth daily   senna-docusate (SENOKOT-S;PERICOLACE) 8.6-50 MG per tablet   Yes No   Sig: Take 1 tablet by mouth daily      Facility-Administered Medications: None     Allergies   Allergies   Allergen Reactions     Nka [No Known Allergies]        Social History   I have reviewed this patient's social history and updated it with pertinent information if needed. Claus Chambers  reports that he quit smoking about 9 years ago. He does not have any smokeless tobacco history on file.    Family History   I have reviewed this patient's family history and updated it with pertinent information if needed.   Family History   Problem Relation Age of Onset     Dementia Mother      Coronary Artery Disease Father      Cerebrovascular Disease Father      Pneumonia Father        Review of Systems   14 point review of systems negative with exception to HPI     Physical Exam   Temp: 97.5  F (36.4  C) Temp src: Oral BP: (!) 181/92 Pulse: 98   Resp: 17 SpO2: 99 % O2 Device: None (Room air)    Vital Signs with Ranges  Temp:  [97.5  F (36.4  C)] 97.5  F (36.4  C)  Pulse:  [68-98] 98  Resp:  [11-25] 17  BP: (169-192)/(71-92) 181/92  SpO2:  [92 %-99 %] 99 %  136 lbs 10.96 oz     , Blood pressure (!) 181/92, pulse 98, temperature 97.5  F (36.4  C), resp. rate 17, height 1.676 m (5' 6\"), weight 62 kg (136 lb 11 oz), SpO2 99 %.  136 lbs 10.96 oz  HEENT:  Normocephalic, atraumatic.  PERRLA.  EOM s intact.   Neck:  Supple, non-tender, without lymphadenopathy.  Heart:  No peripheral edema  Lungs:  No SOB  Abdomen:  Soft, non-tender, non-distended.  Normal bowel sounds.  Skin:  Warm and dry, good capillary refill.  Extremities:  Good radial and dorsalis pedis pulses bilaterally, no edema, cyanosis or clubbing.    NEUROLOGICAL EXAMINATION:   Mental status:  Alert and Oriented x 3, speech is fluent.  Cranial nerves:  II-XII intact.   Motor:  Strength is 5/5 " throughout the upper and lower extremities  Deltoids:  Right: 5/5   Left:  5/5  Biceps:  Right: 5/5   Left:  5/5  Triceps: Right: 5/5   Left:  5/5                       Wrist Extensors: Right: 5/5   Left:  5/5        Wrist Flexors:Right: 5/5   Left:  5/5             Hand : Right: 5/5   Left:  5/5         Hip Flexor: Right: 5/5   Left:  5/5                 Hip Adductor:Right: 5/5   Left:  5/5               Hip Abductor: Right: 5/5   Left:  5/5              Gastroc Soleus:Right: 5/5   Left:  5/5        Tib/Ant:Right: 5/5   Left:  5/5                        EHL:Right: 5/5   Left:  5/5                     Sensation:  Intact to LT throughout   Reflexes:  Negative Babinski.  Negative Clonus.    Coordination:  Smooth finger to nose testing.   Negative pronator drift.   Gait:  Not tested    Cervical examination reveals good range of motion.  No tenderness to palpation of the cervical spine or paraspinous muscles bilaterally.     Lumbar examination reveals tenderness of the spine and paraspinous muscles of lower thoracic upper lumbar spine.  Straight leg raise is negative bilaterally.     Images reviewed personally   IMPRESSION:  1.  The previously described T12 superior endplate compression fracture on the earlier same day 06/19/2023 CT thoracic spine has an acute appearance on the current examination with associated bone edema. Overall mild height loss is unchanged compared to   the prior study. No retropulsion.     2.  At T12-L1, there is a left central/subarticular disc protrusion which deforms the left ventral cord and likely compresses the traversing left L1 nerve root.                                                                     IMPRESSION: head CT  1.  No intracranial hemorrhage, mass lesions, hydrocephalus or CT evidence for an acute infarct.  2.  Mild diffuse cerebral parenchymal volume loss. Presumed chronic hypertensive/microvascular ischemic white matter changes.  3.  Chronic infarcts as detailed  above.    FINDINGS: cervical CT   VERTEBRA: Normal vertebral body heights. No fracture or posttraumatic subluxation.      CANAL/FORAMINA: No high-grade spinal canal stenosis.     PARASPINAL: No extraspinal abnormality.                                                                      IMPRESSION:  1.  No acute fracture.    Data     CBC RESULTS:   Recent Labs   Lab Test 06/19/23  1100   WBC 8.7   RBC 3.82*   HGB 15.4   HCT 43.1   *   MCH 40.3*   MCHC 35.7   RDW 13.9        Basic Metabolic Panel:  Lab Results   Component Value Date     06/19/2023     03/31/2008      Lab Results   Component Value Date    POTASSIUM 3.9 06/19/2023    POTASSIUM 4.0 07/03/2022    POTASSIUM 4.4 03/31/2008     Lab Results   Component Value Date    CHLORIDE 106 06/19/2023    CHLORIDE 106 07/03/2022    CHLORIDE 104 03/31/2008     Lab Results   Component Value Date    GRACIELA 9.7 06/19/2023    GRACIELA 8.7 03/31/2008     Lab Results   Component Value Date    CO2 22 06/19/2023    CO2 16 07/03/2022    CO2 17 03/31/2008     Lab Results   Component Value Date    BUN 10.2 06/19/2023    BUN 14 07/03/2022    BUN 26 03/31/2008     Lab Results   Component Value Date    CR 1.17 06/19/2023    CR 2.05 03/31/2008     Lab Results   Component Value Date     06/19/2023    GLC 96 07/03/2022     03/31/2008     INR:  Lab Results   Component Value Date    INR 1.03 03/31/2008

## 2023-06-20 LAB — BACTERIA UR CULT: NORMAL

## 2023-06-29 ENCOUNTER — HOSPITAL ENCOUNTER (EMERGENCY)
Facility: HOSPITAL | Age: 78
Discharge: HOME | End: 2023-06-29
Admitting: PHYSICIAN ASSISTANT
Payer: COMMERCIAL

## 2023-06-29 ENCOUNTER — APPOINTMENT (OUTPATIENT)
Dept: CT IMAGING | Facility: HOSPITAL | Age: 78
End: 2023-06-29
Attending: PHYSICIAN ASSISTANT
Payer: COMMERCIAL

## 2023-06-29 VITALS
WEIGHT: 140 LBS | TEMPERATURE: 97.7 F | DIASTOLIC BLOOD PRESSURE: 70 MMHG | SYSTOLIC BLOOD PRESSURE: 139 MMHG | HEART RATE: 93 BPM | RESPIRATION RATE: 16 BRPM | BODY MASS INDEX: 22.6 KG/M2 | OXYGEN SATURATION: 94 %

## 2023-06-29 DIAGNOSIS — K56.41 FECAL IMPACTION (H): ICD-10-CM

## 2023-06-29 DIAGNOSIS — R10.30 LOWER ABDOMINAL PAIN: ICD-10-CM

## 2023-06-29 LAB
ALBUMIN SERPL BCG-MCNC: 4 G/DL (ref 3.5–5.2)
ALBUMIN UR-MCNC: 20 MG/DL
ALP SERPL-CCNC: 114 U/L (ref 40–129)
ALT SERPL W P-5'-P-CCNC: 11 U/L (ref 0–70)
ANION GAP SERPL CALCULATED.3IONS-SCNC: 12 MMOL/L (ref 7–15)
APPEARANCE UR: CLEAR
AST SERPL W P-5'-P-CCNC: 19 U/L (ref 0–45)
BASOPHILS # BLD AUTO: 0 10E3/UL (ref 0–0.2)
BASOPHILS NFR BLD AUTO: 0 %
BILIRUB SERPL-MCNC: 0.9 MG/DL
BILIRUB UR QL STRIP: NEGATIVE
BUN SERPL-MCNC: 11.1 MG/DL (ref 8–23)
CALCIUM SERPL-MCNC: 10 MG/DL (ref 8.8–10.2)
CHLORIDE SERPL-SCNC: 102 MMOL/L (ref 98–107)
COLOR UR AUTO: ABNORMAL
CREAT SERPL-MCNC: 1.26 MG/DL (ref 0.67–1.17)
DEPRECATED HCO3 PLAS-SCNC: 22 MMOL/L (ref 22–29)
EOSINOPHIL # BLD AUTO: 0 10E3/UL (ref 0–0.7)
EOSINOPHIL NFR BLD AUTO: 0 %
ERYTHROCYTE [DISTWIDTH] IN BLOOD BY AUTOMATED COUNT: 14.1 % (ref 10–15)
GFR SERPL CREATININE-BSD FRML MDRD: 58 ML/MIN/1.73M2
GLUCOSE SERPL-MCNC: 98 MG/DL (ref 70–99)
GLUCOSE UR STRIP-MCNC: NEGATIVE MG/DL
HCT VFR BLD AUTO: 41.4 % (ref 40–53)
HGB BLD-MCNC: 14.6 G/DL (ref 13.3–17.7)
HGB UR QL STRIP: NEGATIVE
HOLD SPECIMEN: NORMAL
HOLD SPECIMEN: NORMAL
HYALINE CASTS: 3 /LPF
IMM GRANULOCYTES # BLD: 0.1 10E3/UL
IMM GRANULOCYTES NFR BLD: 1 %
KETONES UR STRIP-MCNC: NEGATIVE MG/DL
LACTATE SERPL-SCNC: 1.6 MMOL/L (ref 0.7–2)
LEUKOCYTE ESTERASE UR QL STRIP: NEGATIVE
LYMPHOCYTES # BLD AUTO: 0.9 10E3/UL (ref 0.8–5.3)
LYMPHOCYTES NFR BLD AUTO: 8 %
MCH RBC QN AUTO: 39.8 PG (ref 26.5–33)
MCHC RBC AUTO-ENTMCNC: 35.3 G/DL (ref 31.5–36.5)
MCV RBC AUTO: 113 FL (ref 78–100)
MONOCYTES # BLD AUTO: 1 10E3/UL (ref 0–1.3)
MONOCYTES NFR BLD AUTO: 10 %
MUCOUS THREADS #/AREA URNS LPF: PRESENT /LPF
NEUTROPHILS # BLD AUTO: 8.3 10E3/UL (ref 1.6–8.3)
NEUTROPHILS NFR BLD AUTO: 81 %
NITRATE UR QL: NEGATIVE
NRBC # BLD AUTO: 0 10E3/UL
NRBC BLD AUTO-RTO: 0 /100
PH UR STRIP: 6.5 [PH] (ref 5–7)
PLATELET # BLD AUTO: 208 10E3/UL (ref 150–450)
POTASSIUM SERPL-SCNC: 4.3 MMOL/L (ref 3.4–5.3)
PROT SERPL-MCNC: 6.7 G/DL (ref 6.4–8.3)
RBC # BLD AUTO: 3.67 10E6/UL (ref 4.4–5.9)
RBC URINE: 1 /HPF
SODIUM SERPL-SCNC: 136 MMOL/L (ref 136–145)
SP GR UR STRIP: 1.02 (ref 1–1.03)
UROBILINOGEN UR STRIP-MCNC: 2 MG/DL
WBC # BLD AUTO: 10.3 10E3/UL (ref 4–11)
WBC URINE: 1 /HPF

## 2023-06-29 PROCEDURE — 51702 INSERT TEMP BLADDER CATH: CPT

## 2023-06-29 PROCEDURE — 250N000011 HC RX IP 250 OP 636: Mod: JZ | Performed by: EMERGENCY MEDICINE

## 2023-06-29 PROCEDURE — 36415 COLL VENOUS BLD VENIPUNCTURE: CPT | Performed by: PHYSICIAN ASSISTANT

## 2023-06-29 PROCEDURE — 81001 URINALYSIS AUTO W/SCOPE: CPT | Performed by: PHYSICIAN ASSISTANT

## 2023-06-29 PROCEDURE — 250N000009 HC RX 250: Performed by: EMERGENCY MEDICINE

## 2023-06-29 PROCEDURE — 250N000013 HC RX MED GY IP 250 OP 250 PS 637: Performed by: EMERGENCY MEDICINE

## 2023-06-29 PROCEDURE — 96375 TX/PRO/DX INJ NEW DRUG ADDON: CPT | Mod: 59

## 2023-06-29 PROCEDURE — 51798 US URINE CAPACITY MEASURE: CPT

## 2023-06-29 PROCEDURE — 83605 ASSAY OF LACTIC ACID: CPT | Performed by: PHYSICIAN ASSISTANT

## 2023-06-29 PROCEDURE — 80053 COMPREHEN METABOLIC PANEL: CPT | Performed by: PHYSICIAN ASSISTANT

## 2023-06-29 PROCEDURE — 96374 THER/PROPH/DIAG INJ IV PUSH: CPT | Mod: 59

## 2023-06-29 PROCEDURE — 250N000011 HC RX IP 250 OP 636: Mod: JZ | Performed by: FAMILY MEDICINE

## 2023-06-29 PROCEDURE — 96361 HYDRATE IV INFUSION ADD-ON: CPT

## 2023-06-29 PROCEDURE — 74177 CT ABD & PELVIS W/CONTRAST: CPT

## 2023-06-29 PROCEDURE — 85025 COMPLETE CBC W/AUTO DIFF WBC: CPT | Performed by: PHYSICIAN ASSISTANT

## 2023-06-29 PROCEDURE — 258N000003 HC RX IP 258 OP 636: Performed by: PHYSICIAN ASSISTANT

## 2023-06-29 PROCEDURE — 99285 EMERGENCY DEPT VISIT HI MDM: CPT | Mod: 25

## 2023-06-29 RX ORDER — KETOROLAC TROMETHAMINE 15 MG/ML
15 INJECTION, SOLUTION INTRAMUSCULAR; INTRAVENOUS ONCE
Status: COMPLETED | OUTPATIENT
Start: 2023-06-29 | End: 2023-06-29

## 2023-06-29 RX ORDER — MAGNESIUM HYDROXIDE/ALUMINUM HYDROXICE/SIMETHICONE 120; 1200; 1200 MG/30ML; MG/30ML; MG/30ML
30 SUSPENSION ORAL ONCE
Status: COMPLETED | OUTPATIENT
Start: 2023-06-29 | End: 2023-06-29

## 2023-06-29 RX ORDER — IOPAMIDOL 755 MG/ML
90 INJECTION, SOLUTION INTRAVASCULAR ONCE
Status: COMPLETED | OUTPATIENT
Start: 2023-06-29 | End: 2023-06-29

## 2023-06-29 RX ORDER — HYDROMORPHONE HYDROCHLORIDE 1 MG/ML
0.5 INJECTION, SOLUTION INTRAMUSCULAR; INTRAVENOUS; SUBCUTANEOUS ONCE
Status: COMPLETED | OUTPATIENT
Start: 2023-06-29 | End: 2023-06-29

## 2023-06-29 RX ORDER — LIDOCAINE HYDROCHLORIDE 20 MG/ML
1 JELLY TOPICAL ONCE
Status: COMPLETED | OUTPATIENT
Start: 2023-06-29 | End: 2023-06-29

## 2023-06-29 RX ADMIN — LIDOCAINE HYDROCHLORIDE 1 TUBE: 20 JELLY TOPICAL at 19:58

## 2023-06-29 RX ADMIN — KETOROLAC TROMETHAMINE 15 MG: 15 INJECTION, SOLUTION INTRAMUSCULAR; INTRAVENOUS at 19:20

## 2023-06-29 RX ADMIN — SODIUM CHLORIDE 1000 ML: 9 INJECTION, SOLUTION INTRAVENOUS at 16:18

## 2023-06-29 RX ADMIN — ALUMINUM HYDROXIDE, MAGNESIUM HYDROXIDE, AND SIMETHICONE 30 ML: 200; 200; 20 SUSPENSION ORAL at 19:23

## 2023-06-29 RX ADMIN — HYDROMORPHONE HYDROCHLORIDE 0.5 MG: 1 INJECTION, SOLUTION INTRAMUSCULAR; INTRAVENOUS; SUBCUTANEOUS at 20:14

## 2023-06-29 RX ADMIN — IOPAMIDOL 90 ML: 755 INJECTION, SOLUTION INTRAVENOUS at 18:15

## 2023-06-29 ASSESSMENT — ACTIVITIES OF DAILY LIVING (ADL)
ADLS_ACUITY_SCORE: 35
ADLS_ACUITY_SCORE: 33

## 2023-06-29 ASSESSMENT — ENCOUNTER SYMPTOMS
VOMITING: 0
CONSTIPATION: 1
ABDOMINAL PAIN: 1
APPETITE CHANGE: 1
NAUSEA: 0
RECTAL PAIN: 0

## 2023-06-29 NOTE — ED TRIAGE NOTES
Pt arrives with abdominal pain for at least one week. Reports difficulty having bowel movements; last one today. Had not had a bowel movement since June 19th. Took tylenol this morning. No urinary symptoms, nausea/vomiting.

## 2023-06-29 NOTE — ED NOTES
Pt presents NAD. SKIN: NWD.   HEENT: normocephalic, PERRL, clear x 3.   CHEST: symmetrical rise, CEBBS, no CP or SOB.  ABD: Pt c/o lower mid abdominal pain, denies nausea, vomiting, and diarrhea. Pt c/o constipation and urinary retention.  EXT: ADAMS x 4  Rest of exam unremarkable.

## 2023-06-29 NOTE — ED PROVIDER NOTES
EMERGENCY DEPARTMENT ENCOUNTER      NAME: Claus Chambers  AGE: 78 year old male  YOB: 1945  MRN: [id number]  EVALUATION DATE & TIME: No admission date for patient encounter.    PCP: Vladimir Quiñones    ED PROVIDER: Abner Mueller PA-C      Chief Complaint   Patient presents with     Abdominal Pain         FINAL IMPRESSION:  1. Lower abdominal pain          MEDICAL DECISION MAKING:    Pertinent Labs & Imaging studies reviewed. (See chart for details)  78 year old male presents to the Emergency Department for evaluation of generalized lower abdominal pain.    After obtaining history present illness, reviewing vital signs and briefly examining the patient on the lobby plan to assess with labs, urinalysis, CT scan.  Patient has a history of previous colon cancer, certainly bowel obstruction would be concerning.  Patient could be dealing with some ongoing constipation that is causing some of his symptoms.  He denies any rectal pain.  He has had small bowel bowel movements over the last couple of days.  He only did 1 dose of MiraLAX 2 days ago and has been using stool softeners.  Patient noted to have previous issues with UTIs we will screen with urinalysis as well.    Medical Decision Making    History:    Supplemental history from: Documented in chart, if applicable and Family Member/Significant Other    External Record(s) reviewed: Documented in chart, if applicable. and Prior Imaging: CT results from recent visit were reviewed.    Work Up:    Chart documentation includes differential considered and any EKGs or imaging independently interpreted by provider, where specified.    In additional to work up documented, I considered the following work up: Documented in chart, if applicable.    External consultation:    Discussion of management with another provider: Documented in chart, if applicable    Complicating factors:    Care impacted by chronic illness: Hypertension    Care affected by social  determinants of health: N/A    Disposition considerations: Admission considered. Patient was signed out to the oncoming physician, disposition pending.        4:00 pm: Patient signed out to Nahomi Rowe PA-C. She will follow-up on labs and imaging. If this is negative, I would favor that more consistent use of Miralax as an outpatient to treat constipation could be considered.    ED COURSE    2:30 PM I met with the patient, obtained history, performed an initial exam, and discussed options and plan for diagnostics and treatment here in the ED.    At the conclusion of the encounter I discussed the results of all of the tests and the disposition. The questions were answered. The patient or family acknowledged understanding and was agreeable with the care plan.     MEDICATIONS GIVEN IN THE EMERGENCY:  Medications   0.9% sodium chloride BOLUS (has no administration in time range)       NEW PRESCRIPTIONS STARTED AT TODAY'S ER VISIT  New Prescriptions    No medications on file            =================================================================    HPI    Patient information was obtained from: Patient     Use of Interpretor: N/A         Claus Chambers is a 78 year old male with a pertinent history of hypertension, hemiplegia, and malignant neoplasm of colon  who presents to this ED by wheelchair for evaluation of abdominal pain.     Patient reports a week of lower abdominal pain with constipation. He has been using miralax and stool softeners with slight relief. Patient had a small BM yesterday and this morning. Patient also has had a loss of appetite. Patient denies rectum pain, nausea, vomiting.     Of note, patient has colon surgery due to colon cancer 8 years ago and says he has had bowel issues since. He reports having a bowel movement about every 3 days.       REVIEW OF SYSTEMS   Review of Systems   Constitutional: Positive for appetite change.   Gastrointestinal: Positive for abdominal pain (Lower) and  constipation. Negative for nausea, rectal pain and vomiting.   All other systems reviewed and are negative.         PAST MEDICAL HISTORY:  No past medical history on file.    PAST SURGICAL HISTORY:  Past Surgical History:   Procedure Laterality Date     COLON SURGERY Right 4/6/2015     COLONOSCOPY N/A 3/5/2015    Procedure: COMBINED COLONOSCOPY, SINGLE OR MULTIPLE BIOPSY/POLYPECTOMY BY BIOPSY;  Surgeon: Elliot Edmonds MD;  Location: [facility]     COLONOSCOPY N/A 4/11/2016    Procedure: COLONOSCOPY;  Surgeon: Elliot Edmonds MD;  Location: [facility]     COLONOSCOPY N/A 3/6/2017    Procedure: COLONOSCOPY;  Surgeon: Elliot Edmonds MD;  Location: [facility]     HEMORRHOIDECTOMY EXTERNAL       JOINT REPLACEMENT Right     knee         CURRENT MEDICATIONS:      Current Facility-Administered Medications:      0.9% sodium chloride BOLUS, 1,000 mL, Intravenous, Once, Abner Mueller PA-C    Current Outpatient Medications:      AMLODIPINE BESYLATE 10 MG OR TABS, 1 TABLET BY MOUTH DAILY, Disp: , Rfl:      ASPIRIN 325 MG OR TABS, 1 TABLET BY MOUTH DAILY, Disp: , Rfl:      cephALEXin (KEFLEX) 500 MG capsule, Take 1 capsule (500 mg) by mouth 4 times daily for 10 days, Disp: 40 capsule, Rfl: 0     hydroxyurea (HYDREA) 500 MG capsule, Take by mouth daily, Disp: , Rfl:      LISINOPRIL PO, Take 20 mg by mouth, Disp: , Rfl:      loperamide (IMODIUM A-D) 2 MG tablet, Take 1 tablet (2 mg) by mouth 4 times daily as needed for diarrhea, Disp: 21 tablet, Rfl: 0     psyllium 0.52 G capsule, Take 1 capsule by mouth daily, Disp: , Rfl:      senna-docusate (SENOKOT-S;PERICOLACE) 8.6-50 MG per tablet, Take 1 tablet by mouth daily, Disp: , Rfl:      TYLENOL COLD MULTI-SYMPTOM 5--325 MG OR TABS, AS NEEDED, Disp: , Rfl:       ALLERGIES:  Allergies   Allergen Reactions     Nka [No Known Allergies]        FAMILY HISTORY:  Family History   Problem Relation Age of Onset     Dementia Mother      Coronary Artery Disease Father      Cerebrovascular  Disease Father      Pneumonia Father        SOCIAL HISTORY:   Social History     Socioeconomic History     Marital status:    Tobacco Use     Smoking status: Former     Types: Cigarettes     Quit date: 2013     Years since quittin.6       VITALS:  Patient Vitals for the past 24 hrs:   BP Temp Temp src Pulse Resp SpO2 Weight   23 1420 135/66 97.7  F (36.5  C) Oral 96 16 98 % 63.5 kg (140 lb)       PHYSICAL EXAM    Physical Exam  Vitals and nursing note reviewed.   Constitutional:       General: He is not in acute distress.     Appearance: Normal appearance. He is not ill-appearing, toxic-appearing or diaphoretic.   HENT:      Head: Normocephalic.      Right Ear: External ear normal.      Left Ear: External ear normal.   Eyes:      Conjunctiva/sclera: Conjunctivae normal.   Cardiovascular:      Rate and Rhythm: Normal rate.      Pulses: Normal pulses.   Pulmonary:      Effort: Pulmonary effort is normal. No respiratory distress.   Abdominal:      General: There is no distension.      Tenderness: There is abdominal tenderness. There is no guarding or rebound.   Musculoskeletal:         General: No tenderness or deformity. Normal range of motion.      Cervical back: Normal range of motion.   Skin:     General: Skin is warm and dry.   Neurological:      General: No focal deficit present.      Mental Status: He is alert. Mental status is at baseline.      Sensory: No sensory deficit.      Motor: No weakness.          LAB:  All pertinent labs reviewed and interpreted.       RADIOLOGY:  Reviewed all pertinent imaging. Please see official radiology report.  CT Abdomen Pelvis w Contrast    (Results Pending)         ICricket, am serving as a scribe to document services personally performed by Abner Mueller PA-C based on my observation and the provider's statements to me. IAbner PA-C attest that Cricket Brooks is acting in a scribe capacity, has observed my performance of the  services and has documented them in accordance with my direction.    Abner Mueller PA-C  Emergency Medicine  [facility]       Abner Mueller PA-C  06/29/23 [id number]

## 2023-06-29 NOTE — ED PROVIDER NOTES
eMERGENCY dEPARTMENT PROGRESS NOTE         ED COURSE AND Brief history:  4:12 PM Patient was signed out to me by Abner Mueller PA-C    5:20 PM I met with the patient to gather additional history We discussed plans for the ED course, including diagnostic testing and treatment. Patient reported retention had started while here in the ED. He endorses a history, including retention due to constipation, which patient reported he has recently  been struggling with. Discussed peterson catheter placement with patient and patient's wife wife which they were agreeable to     Claus Chambers is a 78 year old male with a pertinent history of hypertension, hemiplegia, and malignant neoplasm of colon who presented to this ED by wheelchair for evaluation of abdominal pain.      Patient reported a week of lower abdominal pain with constipation. He had been using Miralax and stool softeners with slight relief. Patient had a small BM yesterday and this morning. Of note, patient has colon surgery due to colon cancer 8 years ago and says he has had bowel issues since. He endorses having a bowel movement approximately every 3 days. Patient also has had a loss of appetite. Patient denies any recent rectal pain, nausea, vomiting.     At the conclusion of the encounter I discussed the results of all of the tests and the disposition. The questions were answered. The patient or family acknowledged understanding and was agreeable with the care plan.      MEDICAL DECISION MAKING:  Claus Ramirez is an 78-year-old male with history of colon cancer status post colectomy who presents to the emergency department today for evaluation of lower abdominal pain and constipation.  The patient has had 1 week of lower abdominal pain and constipation.  He has been using some MiraLAX and some stool softeners with minimal relief but has not taken in many days of this as he does not tolerate this very well.  He was concerned as he has not been passing as much stool  as usual and with the worsening abdominal pain he had concerns for possible obstruction at present to the emergency department.  On my evaluation, the patient was vitally stable and well-appearing.  Examination with tenderness to palpation in the lower abdomen.  Patient was signed out to me pending CBC, BMP, UA, LFTs and CT abdomen pelvis with contrast.  CBC without any significant derangements.  CMP with slightly elevated creatinine of 1.26 but no concern for KEATON at this time.  Lactate normal at 1.6.  UA without any concerns for infection.  During his visit here the patient started to have some urinary retention.  He has history of urinary retention with significant constipation and has had Lombardi catheters due to this.  We discussed straight cath versus Lombardi catheter and with his symptoms of constipation and retention at this time I do feel Lombardi catheter is more appropriate and patient and wife were in agreement understanding.  Lombardi catheter was placed with 1200 mL of urine output.  CT of the abdomen pelvis did show rectal fecal impaction with some perirectal edema compatible with colitis.  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  Patient has good perfusion to bilateral lower extremity is and I spoke with the radiologist Dr. Main who stated that these are new since comparing to the CT done in 2017 and if he has good perfusion on exam there is no significant concern for lack of perfusion on CT.  I performed a fecal disimpaction with good relief of symptoms and was able to feel stool moving up and down in the rectum and no longer impacted.  We discussed possible ways of helping evacuate stool including suppositories, enemas as well as GoLytely.  At this time, patient would not like anything else per rectum which I feel is appropriate.  We discussed GoLytely  and patient and wife are in agreement understanding.  We discussed signs and symptoms to return to the emergency department and all questions were to the best my ability.  He was given a dose of Toradol and Dilaudid here with improvement of symptoms.  Additionally, patient started to have some upper GI upset which she typically has with GERD.  He was given some Maalox with improvement of symptoms.  We discussed taking MiraLAX at home after the GoLytely to continue normal bowel movements as well as if he takes the hydromorphone for his T12 fracture to make sure that he is continuing to take MiraLAX to prevent constipation and impaction again.  We discussed close follow-up with primary care and continued use of his back brace.  All questions were answered to the best my ability and he was discharged in stable condition.    LAB  Pertinent labs results reviewed   Results for orders placed or performed during the hospital encounter of 06/29/23   CT Abdomen Pelvis w Contrast    Impression    IMPRESSION:   1.  Rectal fecal impaction with associated perirectal edema compatible with colitis (stercoral).  2.  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 No change compared to 04/10/2017. New left superficial femoral arterial occlusion, patent proximal profunda branches.   Comprehensive metabolic panel   Result Value Ref Range    Sodium 136 136 - 145 mmol/L    Potassium 4.3 3.4 - 5.3 mmol/L    Chloride 102 98 - 107 mmol/L    Carbon Dioxide (CO2) 22 22 - 29 mmol/L    Anion Gap 12 7 - 15 mmol/L    Urea Nitrogen 11.1 8.0 - 23.0 mg/dL    Creatinine 1.26 (H) 0.67 - 1.17 mg/dL    Calcium 10.0 8.8 - 10.2 mg/dL    Glucose 98 70 - 99 mg/dL    Alkaline Phosphatase 114 40 - 129 U/L    AST 19 0 - 45 U/L    ALT 11 0 - 70 U/L    Protein Total 6.7 6.4 - 8.3 g/dL    Albumin 4.0 3.5 - 5.2 g/dL    Bilirubin Total 0.9 <=1.2 mg/dL    GFR Estimate 58 (L) >60 mL/min/1.73m2   Lactic acid whole blood   Result Value Ref  Range    Lactic Acid 1.6 0.7 - 2.0 mmol/L   UA with Microscopic reflex to Culture    Specimen: Urine, Lombardi Catheter   Result Value Ref Range    Color Urine Light Yellow Colorless, Straw, Light Yellow, Yellow    Appearance Urine Clear Clear    Glucose Urine Negative Negative mg/dL    Bilirubin Urine Negative Negative    Ketones Urine Negative Negative mg/dL    Specific Gravity Urine 1.016 1.001 - 1.030    Blood Urine Negative Negative    pH Urine 6.5 5.0 - 7.0    Protein Albumin Urine 20 (A) Negative mg/dL    Urobilinogen Urine 2.0 (A) <2.0 mg/dL    Nitrite Urine Negative Negative    Leukocyte Esterase Urine Negative Negative    Mucus Urine Present (A) None Seen /LPF    RBC Urine 1 <=2 /HPF    WBC Urine 1 <=5 /HPF    Hyaline Casts Urine 3 (H) <=2 /LPF   CBC with platelets and differential   Result Value Ref Range    WBC Count 10.3 4.0 - 11.0 10e3/uL    RBC Count 3.67 (L) 4.40 - 5.90 10e6/uL    Hemoglobin 14.6 13.3 - 17.7 g/dL    Hematocrit 41.4 40.0 - 53.0 %     (H) 78 - 100 fL    MCH 39.8 (H) 26.5 - 33.0 pg    MCHC 35.3 31.5 - 36.5 g/dL    RDW 14.1 10.0 - 15.0 %    Platelet Count 208 150 - 450 10e3/uL    % Neutrophils 81 %    % Lymphocytes 8 %    % Monocytes 10 %    % Eosinophils 0 %    % Basophils 0 %    % Immature Granulocytes 1 %    NRBCs per 100 WBC 0 <1 /100    Absolute Neutrophils 8.3 1.6 - 8.3 10e3/uL    Absolute Lymphocytes 0.9 0.8 - 5.3 10e3/uL    Absolute Monocytes 1.0 0.0 - 1.3 10e3/uL    Absolute Eosinophils 0.0 0.0 - 0.7 10e3/uL    Absolute Basophils 0.0 0.0 - 0.2 10e3/uL    Absolute Immature Granulocytes 0.1 <=0.4 10e3/uL    Absolute NRBCs 0.0 10e3/uL   Extra Blue Top Tube   Result Value Ref Range    Hold Specimen JIC    Extra Red Top Tube   Result Value Ref Range    Hold Specimen JIC          RADIOLOGY    Pertinent imaging reviewed   Please see official radiology report.  CT Abdomen Pelvis w Contrast   Final Result   IMPRESSION:    1.  Rectal fecal impaction with associated perirectal edema  compatible with colitis (stercoral).   2.  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 No change compared to 04/10/2017. New left superficial femoral arterial occlusion, patent proximal profunda branches.          FINAL IMPRESSION    1. Lower abdominal pain    2. Fecal impaction (H)         DISCHARGE PRESCRIPTIONS  Discharge Medication List as of 6/29/2023  9:02 PM      START taking these medications    Details   polyethylene glycol (GOLYTELY) 236 g suspension Take 4,000 mLs by mouth once for 1 dose, Disp-4000 mL, R-0, Local Print                 Soledad, Nahomi FRAZIER PA-C  06/30/23 0044

## 2023-06-30 NOTE — DISCHARGE INSTRUCTIONS
You are seen and evaluated here in the emergency department for fecal impaction in the rectum.  Fortunately I was able to disimpact this.  We will send you home with some GoLytely to help with constipation.  We will be sending you with a whole bottle of GoLytely to take until you have significant bowel movement, like a colon prep. 240 mL (8 oz) every 10 minutes until 4 L are consumed or the rectal effluent is clear; rapid drinking of each portion is preferred to drinking small amounts continuously     I recommend after this taking MiraLAX to keep your bowel movements moving and help prevent fecal impactions in the future.  You can take 1-2 capfuls daily, if you start with 2 capfuls I would recommend doing this for about 1 week and then doing just 1 daily.  I recommend close follow-up with primary care in the next 5 to 7 days for recheck of symptoms.    I recommend continuing to take pain medications and wearing your brace for your compression fracture at T12.  You do not have a urinary tract infection and I recommend stopping taking these antibiotics however, you do now have a Lombardi due to the impaction and causing urinary retention.  You should follow-up with urology and call them tomorrow to get an appointment scheduled for next week for Lombardi removal.    If you develop severe worsening abdominal pain, fevers, uncontrolled vomiting, black or bloody stools or any other concerns you should return to the emergency department.

## 2024-06-21 ENCOUNTER — OFFICE VISIT (OUTPATIENT)
Dept: URBAN - METROPOLITAN AREA CLINIC 87 | Facility: CLINIC | Age: 79
End: 2024-06-21
Payer: COMMERCIAL

## 2024-06-21 DIAGNOSIS — H35.3211 EXDTVE AGE-REL MCLR DEGN, RIGHT EYE, WITH ACTV CHRDL NEOVAS: Primary | ICD-10-CM

## 2024-06-21 DIAGNOSIS — H35.3122 NEXDTVE AGE-RELATED MCLR DEGN, LEFT EYE, INTERMED DRY STAGE: ICD-10-CM

## 2024-06-21 DIAGNOSIS — H25.13 AGE-RELATED NUCLEAR CATARACT, BILATERAL: ICD-10-CM

## 2024-06-21 DIAGNOSIS — H43.21 CRYSTALLINE DEPOSITS IN VITREOUS BODY, RIGHT EYE: ICD-10-CM

## 2024-06-21 PROCEDURE — 99204 OFFICE O/P NEW MOD 45 MIN: CPT | Performed by: OPHTHALMOLOGY

## 2024-06-21 PROCEDURE — 92134 CPTRZ OPH DX IMG PST SGM RTA: CPT | Performed by: OPHTHALMOLOGY

## 2024-06-21 PROCEDURE — 67028 INJECTION EYE DRUG: CPT | Performed by: OPHTHALMOLOGY

## 2024-06-21 ASSESSMENT — INTRAOCULAR PRESSURE
OS: 12
OD: 12

## 2024-09-06 ENCOUNTER — OFFICE VISIT (OUTPATIENT)
Dept: URBAN - METROPOLITAN AREA CLINIC 86 | Facility: CLINIC | Age: 79
End: 2024-09-06
Payer: MEDICARE

## 2024-09-06 DIAGNOSIS — H35.3211 EXDTVE AGE-REL MCLR DEGN, RIGHT EYE, WITH ACTV CHRDL NEOVAS: Primary | ICD-10-CM

## 2024-09-06 DIAGNOSIS — H25.13 AGE-RELATED NUCLEAR CATARACT, BILATERAL: ICD-10-CM

## 2024-09-06 DIAGNOSIS — H35.3122 NEXDTVE AGE-RELATED MCLR DEGN, LEFT EYE, INTERMED DRY STAGE: ICD-10-CM

## 2024-09-06 DIAGNOSIS — H43.21 CRYSTALLINE DEPOSITS IN VITREOUS BODY, RIGHT EYE: ICD-10-CM

## 2024-09-06 PROCEDURE — 99214 OFFICE O/P EST MOD 30 MIN: CPT | Performed by: OPHTHALMOLOGY

## 2024-09-06 PROCEDURE — 92134 CPTRZ OPH DX IMG PST SGM RTA: CPT | Performed by: OPHTHALMOLOGY

## 2024-09-06 ASSESSMENT — INTRAOCULAR PRESSURE
OS: 11
OD: 11

## 2024-11-22 ENCOUNTER — OFFICE VISIT (OUTPATIENT)
Dept: URBAN - METROPOLITAN AREA CLINIC 87 | Facility: CLINIC | Age: 79
End: 2024-11-22
Payer: MEDICARE

## 2024-11-22 DIAGNOSIS — H35.3122 NEXDTVE AGE-RELATED MCLR DEGN, LEFT EYE, INTERMED DRY STAGE: ICD-10-CM

## 2024-11-22 DIAGNOSIS — H00.11 CHALAZION RIGHT UPPER EYELID: ICD-10-CM

## 2024-11-22 DIAGNOSIS — H35.3211 EXDTVE AGE-REL MCLR DEGN, RIGHT EYE, WITH ACTV CHRDL NEOVAS: Primary | ICD-10-CM

## 2024-11-22 DIAGNOSIS — H43.21 CRYSTALLINE DEPOSITS IN VITREOUS BODY, RIGHT EYE: ICD-10-CM

## 2024-11-22 DIAGNOSIS — H25.13 AGE-RELATED NUCLEAR CATARACT, BILATERAL: ICD-10-CM

## 2024-11-22 PROCEDURE — 92134 CPTRZ OPH DX IMG PST SGM RTA: CPT | Performed by: OPHTHALMOLOGY

## 2024-11-22 PROCEDURE — 99214 OFFICE O/P EST MOD 30 MIN: CPT | Performed by: OPHTHALMOLOGY

## 2024-11-22 RX ORDER — OFLOXACIN 3 MG/ML
0.3 % SOLUTION/ DROPS OPHTHALMIC
Qty: 5 | Refills: 2 | Status: INACTIVE
Start: 2024-11-22 | End: 2025-02-19

## 2024-11-22 ASSESSMENT — INTRAOCULAR PRESSURE
OS: 12
OD: 12

## 2025-01-17 ENCOUNTER — OFFICE VISIT (OUTPATIENT)
Age: 80
End: 2025-01-17
Payer: MEDICARE

## 2025-01-17 DIAGNOSIS — H35.3211 EXUDATIVE AGE-RELATED MACULAR DEGENERATION, RIGHT EYE, WITH ACTIVE CHOROIDAL NEOVASCULARIZATION: Primary | ICD-10-CM

## 2025-01-17 PROCEDURE — 67028 INJECTION EYE DRUG: CPT | Performed by: OPHTHALMOLOGY

## 2025-01-17 PROCEDURE — 92134 CPTRZ OPH DX IMG PST SGM RTA: CPT | Performed by: OPHTHALMOLOGY

## 2025-01-17 ASSESSMENT — INTRAOCULAR PRESSURE
OS: 6
OD: 9

## 2025-02-28 ENCOUNTER — OFFICE VISIT (OUTPATIENT)
Age: 80
End: 2025-02-28
Payer: MEDICARE

## 2025-02-28 DIAGNOSIS — H35.3211 EXUDATIVE AGE-RELATED MACULAR DEGENERATION, RIGHT EYE, WITH ACTIVE CHOROIDAL NEOVASCULARIZATION: Primary | ICD-10-CM

## 2025-02-28 PROCEDURE — 92134 CPTRZ OPH DX IMG PST SGM RTA: CPT | Performed by: OPHTHALMOLOGY

## 2025-02-28 PROCEDURE — 67028 INJECTION EYE DRUG: CPT | Performed by: OPHTHALMOLOGY

## 2025-02-28 ASSESSMENT — INTRAOCULAR PRESSURE
OS: 10
OD: 11

## 2025-06-06 ENCOUNTER — OFFICE VISIT (OUTPATIENT)
Age: 80
End: 2025-06-06
Payer: MEDICARE

## 2025-06-06 DIAGNOSIS — H35.3211 EXUDATIVE AGE-RELATED MACULAR DEGENERATION, RIGHT EYE, WITH ACTIVE CHOROIDAL NEOVASCULARIZATION: Primary | ICD-10-CM

## 2025-06-06 PROCEDURE — 92134 CPTRZ OPH DX IMG PST SGM RTA: CPT | Performed by: OPHTHALMOLOGY

## 2025-06-06 PROCEDURE — 67028 INJECTION EYE DRUG: CPT | Performed by: OPHTHALMOLOGY

## 2025-06-06 ASSESSMENT — INTRAOCULAR PRESSURE
OD: 13
OS: 21